# Patient Record
Sex: MALE | Race: WHITE | NOT HISPANIC OR LATINO | Employment: OTHER
[De-identification: names, ages, dates, MRNs, and addresses within clinical notes are randomized per-mention and may not be internally consistent; named-entity substitution may affect disease eponyms.]

---

## 2017-01-19 ENCOUNTER — PRIOR ORIGINAL RECORDS (OUTPATIENT)
Dept: OTHER | Age: 67
End: 2017-01-19

## 2017-04-18 ENCOUNTER — PRIOR ORIGINAL RECORDS (OUTPATIENT)
Dept: OTHER | Age: 67
End: 2017-04-18

## 2017-04-18 LAB
ALBUMIN: 4 G/DL
ALKALINE PHOSPHATATE(ALK PHOS): 38 IU/L
ALT (SGPT): 90 U/L
AST (SGOT): 56 U/L
BILIRUBIN TOTAL: 0.7 MG/DL
BUN: 19 MG/DL
CALCIUM: 9 MG/DL
CHLORIDE: 103 MEQ/L
CHOLESTEROL, TOTAL: 136 MG/DL
CREATININE, SERUM: 0.9 MG/DL
GLUCOSE: 167 MG/DL
HDL CHOLESTEROL: 27 MG/DL
HEMATOCRIT: 41.5 %
HEMOGLOBIN: 14.2 G/DL
LDL CHOLESTEROL: 52 MG/DL
PLATELETS: 170 K/UL
POTASSIUM, SERUM: 4.1 MEQ/L
RED BLOOD COUNT: 4.74 X 10-6/U
SGOT (AST): 56 IU/L
SGPT (ALT): 90 IU/L
SODIUM: 137 MEQ/L
TRIGLYCERIDES: 285 MG/DL
WHITE BLOOD COUNT: 6.28 X 10-3/U

## 2017-08-28 ENCOUNTER — PRIOR ORIGINAL RECORDS (OUTPATIENT)
Dept: OTHER | Age: 67
End: 2017-08-28

## 2017-11-10 ENCOUNTER — PRIOR ORIGINAL RECORDS (OUTPATIENT)
Dept: OTHER | Age: 67
End: 2017-11-10

## 2017-11-10 LAB
ALT (SGPT): 92 U/L
AST (SGOT): 54 U/L
BUN: 22 MG/DL
CHLORIDE: 101 MEQ/L
CHOLESTEROL, TOTAL: 137 MG/DL
CREATININE, SERUM: 0.92 MG/DL
GLUCOSE: 218 MG/DL
GLUCOSE: 218 MG/DL
GLYCOHEMOGLOBIN: 8.2 %
HDL CHOLESTEROL: 24 MG/DL
HEMATOCRIT: 42.1 %
HEMOGLOBIN: 13.8 G/DL
MAGNESIUM: 1.8 MG/DL
PLATELETS: 163 K/UL
POTASSIUM, SERUM: 4.4 MEQ/L
RED BLOOD COUNT: 4.79 X 10-6/U
SGOT (AST): 54 IU/L
SGPT (ALT): 92 IU/L
SODIUM: 135 MEQ/L
TRIGLYCERIDES: 458 MG/DL
WHITE BLOOD COUNT: 5.81 X 10-3/U

## 2017-11-14 ENCOUNTER — MYAURORA ACCOUNT LINK (OUTPATIENT)
Dept: OTHER | Age: 67
End: 2017-11-14

## 2017-11-16 ENCOUNTER — PRIOR ORIGINAL RECORDS (OUTPATIENT)
Dept: OTHER | Age: 67
End: 2017-11-16

## 2017-12-01 ENCOUNTER — PRIOR ORIGINAL RECORDS (OUTPATIENT)
Dept: OTHER | Age: 67
End: 2017-12-01

## 2017-12-15 ENCOUNTER — PRIOR ORIGINAL RECORDS (OUTPATIENT)
Dept: OTHER | Age: 67
End: 2017-12-15

## 2017-12-15 ENCOUNTER — MYAURORA ACCOUNT LINK (OUTPATIENT)
Dept: OTHER | Age: 67
End: 2017-12-15

## 2017-12-18 ENCOUNTER — PRIOR ORIGINAL RECORDS (OUTPATIENT)
Dept: OTHER | Age: 67
End: 2017-12-18

## 2018-01-02 ENCOUNTER — PRIOR ORIGINAL RECORDS (OUTPATIENT)
Dept: OTHER | Age: 68
End: 2018-01-02

## 2018-01-29 ENCOUNTER — PRIOR ORIGINAL RECORDS (OUTPATIENT)
Dept: OTHER | Age: 68
End: 2018-01-29

## 2018-01-29 LAB
BUN: 19 MG/DL
CALCIUM: 9.2 MG/DL
CHLORIDE: 100 MEQ/L
CHOLESTEROL, TOTAL: 126 MG/DL
CREATININE, SERUM: 0.78 MG/DL
GLUCOSE: 169 MG/DL
GLUCOSE: 169 MG/DL
HDL CHOLESTEROL: 28 MG/DL
HEMATOCRIT: 44.5 %
HEMOGLOBIN: 14.5 G/DL
LDL CHOLESTEROL: 57 MG/DL
PLATELETS: 184 K/UL
POTASSIUM, SERUM: 4.1 MEQ/L
RED BLOOD COUNT: 5.04 X 10-6/U
SGOT (AST): 45 IU/L
SGPT (ALT): 86 IU/L
SODIUM: 135 MEQ/L
TRIGLYCERIDES: 395 MG/DL
WHITE BLOOD COUNT: 7.87 X 10-3/U

## 2018-02-26 ENCOUNTER — MYAURORA ACCOUNT LINK (OUTPATIENT)
Dept: OTHER | Age: 68
End: 2018-02-26

## 2018-02-26 ENCOUNTER — PRIOR ORIGINAL RECORDS (OUTPATIENT)
Dept: OTHER | Age: 68
End: 2018-02-26

## 2018-06-14 ENCOUNTER — HOSPITAL ENCOUNTER (OUTPATIENT)
Dept: GENERAL RADIOLOGY | Facility: HOSPITAL | Age: 68
Discharge: HOME | End: 2018-06-14
Attending: SPECIALIST
Payer: MEDICARE

## 2018-06-14 DIAGNOSIS — I25.10 CAD (CORONARY ARTERY DISEASE): ICD-10-CM

## 2018-06-14 DIAGNOSIS — I10 HYPERTENSION, UNSPECIFIED TYPE: ICD-10-CM

## 2018-06-14 DIAGNOSIS — R06.02 SOB (SHORTNESS OF BREATH): ICD-10-CM

## 2018-06-14 PROCEDURE — 71046 X-RAY EXAM CHEST 2 VIEWS: CPT | Performed by: SPECIALIST

## 2018-07-13 ENCOUNTER — OFFICE VISIT (OUTPATIENT)
Dept: FAMILY MEDICINE CLINIC | Facility: CLINIC | Age: 68
End: 2018-07-13

## 2018-07-13 VITALS
RESPIRATION RATE: 16 BRPM | HEART RATE: 76 BPM | BODY MASS INDEX: 34.1 KG/M2 | HEIGHT: 68 IN | WEIGHT: 225 LBS | OXYGEN SATURATION: 96 % | SYSTOLIC BLOOD PRESSURE: 138 MMHG | DIASTOLIC BLOOD PRESSURE: 78 MMHG

## 2018-07-13 DIAGNOSIS — Z86.73 HISTORY OF CVA (CEREBROVASCULAR ACCIDENT): ICD-10-CM

## 2018-07-13 DIAGNOSIS — G47.33 OBSTRUCTIVE SLEEP APNEA SYNDROME: Primary | ICD-10-CM

## 2018-07-13 DIAGNOSIS — I10 ESSENTIAL HYPERTENSION, BENIGN: ICD-10-CM

## 2018-07-13 DIAGNOSIS — E78.2 MIXED HYPERLIPIDEMIA: ICD-10-CM

## 2018-07-13 DIAGNOSIS — F43.10 PTSD (POST-TRAUMATIC STRESS DISORDER): ICD-10-CM

## 2018-07-13 DIAGNOSIS — E11.69 TYPE 2 DIABETES MELLITUS WITH OTHER SPECIFIED COMPLICATION, WITHOUT LONG-TERM CURRENT USE OF INSULIN (HCC): ICD-10-CM

## 2018-07-13 DIAGNOSIS — I25.10 CORONARY ARTERY DISEASE INVOLVING NATIVE HEART WITHOUT ANGINA PECTORIS, UNSPECIFIED VESSEL OR LESION TYPE: ICD-10-CM

## 2018-07-13 DIAGNOSIS — M51.26 LUMBAR DISCOGENIC PAIN SYNDROME: ICD-10-CM

## 2018-07-13 PROCEDURE — 99204 OFFICE O/P NEW MOD 45 MIN: CPT | Performed by: FAMILY MEDICINE

## 2018-07-13 RX ORDER — HYDROCHLOROTHIAZIDE 25 MG/1
25 TABLET ORAL DAILY
COMMUNITY
End: 2020-01-28 | Stop reason: ALTCHOICE

## 2018-07-13 RX ORDER — ETODOLAC 400 MG/1
400 TABLET, FILM COATED ORAL AS NEEDED
COMMUNITY
End: 2020-01-28 | Stop reason: ALTCHOICE

## 2018-07-13 RX ORDER — FENOFIBRATE 160 MG/1
145 TABLET ORAL DAILY
COMMUNITY

## 2018-07-13 RX ORDER — ATORVASTATIN CALCIUM 40 MG/1
40 TABLET, FILM COATED ORAL NIGHTLY
COMMUNITY
End: 2019-11-13 | Stop reason: ALTCHOICE

## 2018-07-13 RX ORDER — PRAZOSIN HYDROCHLORIDE 1 MG/1
1 CAPSULE ORAL NIGHTLY
COMMUNITY

## 2018-07-13 RX ORDER — CHLORAL HYDRATE 500 MG
3000 CAPSULE ORAL DAILY
COMMUNITY
End: 2019-11-13 | Stop reason: ALTCHOICE

## 2018-07-13 RX ORDER — LISINOPRIL 40 MG/1
40 TABLET ORAL DAILY
COMMUNITY

## 2018-07-13 RX ORDER — ASPIRIN 325 MG
325 TABLET ORAL DAILY
COMMUNITY

## 2018-07-13 RX ORDER — GLUCOSAMINE HCL 500 MG
TABLET ORAL
COMMUNITY

## 2018-07-13 RX ORDER — HYDROCODONE BITARTRATE AND ACETAMINOPHEN 5; 325 MG/1; MG/1
1 TABLET ORAL AS NEEDED
COMMUNITY

## 2018-07-13 RX ORDER — AMLODIPINE BESYLATE 5 MG/1
5 TABLET ORAL DAILY
COMMUNITY

## 2018-07-13 RX ORDER — ESCITALOPRAM OXALATE 20 MG/1
40 TABLET ORAL DAILY
COMMUNITY

## 2018-07-13 RX ORDER — METOPROLOL TARTRATE 50 MG/1
50 TABLET, FILM COATED ORAL 2 TIMES DAILY
COMMUNITY

## 2018-07-14 NOTE — PROGRESS NOTES
HPI:   Patient presents with:  Sleep Problem: sleep study, [facility] sleep [place]  Heart Disease  CVA: history  Diabetes  Hypertension  Mental Illness: PTSD      Marvin Ramon is a 77year old male.     He primarily presents for:  Sleep apnea and worsening sn above    Pt has been taking his medication(s) as instructed, no medication side effects. See ROS for additional pertinent positive and negative complaints.     Labs:     Lab Results  Component Value Date/Time   A1C 6.9 (H) 06/18/2010 08:52 AM        Lab Vitamins-Minerals (MULTIVITAL) Oral Tab Take 1 tablet by mouth daily. Disp:  Rfl:    Cholecalciferol (VITAMIN D3) 3000 units Oral Tab Take by mouth. Disp:  Rfl:    omega-3 fatty acids 1000 MG Oral Cap Take 3,000 mg by mouth daily.  Disp:  Rfl:    Etodolac 4 or weakness  PSYCH:  No mood concerns     EXAM:   /78 (BP Location: Right arm)   Pulse 76   Resp 16   Ht 68\"   Wt 225 lb   SpO2 96%   BMI 34.21 kg/m²  Estimated body mass index is 34.21 kg/m² as calculated from the following:    Height as of this en complication, without long-term current use of insulin ([address]  Appears stable, patient sees physician at [facility], will continue present management, follow-up with them as routine and with me in 4-6 months  - insulin glargine (LANTUS) 100 UNIT/ML Subcutane SHIP  Relatively stable on current management, will continue present management, patient to follow-up with psychiatrist through the [place] as routine  - escitalopram 20 MG Oral Tab; Take 40 mg by mouth daily.       The patient indicates understanding of the abo

## 2018-07-21 ENCOUNTER — OFFICE VISIT (OUTPATIENT)
Dept: SLEEP CENTER | Facility: HOSPITAL | Age: 68
End: 2018-07-21
Attending: FAMILY MEDICINE
Payer: MEDICARE

## 2018-07-21 DIAGNOSIS — G47.33 OBSTRUCTIVE SLEEP APNEA SYNDROME: ICD-10-CM

## 2018-07-21 PROCEDURE — 95811 POLYSOM 6/>YRS CPAP 4/> PARM: CPT

## 2018-07-26 ENCOUNTER — MED REC SCAN ONLY (OUTPATIENT)
Dept: FAMILY MEDICINE CLINIC | Facility: CLINIC | Age: 68
End: 2018-07-26

## 2018-07-27 NOTE — PROCEDURES
[address]       Accredited by the [facility] Sleep [facility] ([facility])    PATIENT'S NAME:        NOREEN Donnelly  ATTENDING PHYSICIAN:   Anderson Comer M.D. REFERRING PHYSICIAN:   TAL [place] study, and there was an additional channel for measurement of CPAP flow. FINDINGS:  Please note that this study consists of 2 parts, the first a diagnostic and the second a CPAP titration.     The diagnostic portion of the study had lights out at 10:25 brought to a final pressure of 12. Sleep-disordered breathing appeared to be under relatively good control at a CPAP pressure of 13 which was observed during supine REM and non-REM sleep.   The pressure was increased due to residual events occurring during patient needs to understand the potential dangers associated with reduced daytime vigilance. 6.   Consider further evaluation for restless legs syndrome although it is likely related to his obstructive sleep apnea with some improvement on CPAP.     Thank y

## 2018-10-22 ENCOUNTER — OFFICE VISIT (OUTPATIENT)
Dept: FAMILY MEDICINE CLINIC | Facility: CLINIC | Age: 68
End: 2018-10-22
Payer: MEDICARE

## 2018-10-22 ENCOUNTER — APPOINTMENT (OUTPATIENT)
Dept: LAB | Facility: REFERENCE LAB | Age: 68
End: 2018-10-22
Attending: FAMILY MEDICINE
Payer: MEDICARE

## 2018-10-22 VITALS
DIASTOLIC BLOOD PRESSURE: 62 MMHG | WEIGHT: 232 LBS | OXYGEN SATURATION: 96 % | RESPIRATION RATE: 17 BRPM | TEMPERATURE: 98 F | HEIGHT: 68 IN | SYSTOLIC BLOOD PRESSURE: 138 MMHG | BODY MASS INDEX: 35.16 KG/M2 | HEART RATE: 68 BPM

## 2018-10-22 DIAGNOSIS — J30.89 ALLERGIC RHINITIS DUE TO OTHER ALLERGIC TRIGGER, UNSPECIFIED SEASONALITY: ICD-10-CM

## 2018-10-22 DIAGNOSIS — J30.89 ALLERGIC RHINITIS DUE TO OTHER ALLERGIC TRIGGER, UNSPECIFIED SEASONALITY: Primary | ICD-10-CM

## 2018-10-22 DIAGNOSIS — Z23 ENCOUNTER FOR IMMUNIZATION: ICD-10-CM

## 2018-10-22 DIAGNOSIS — L20.89 OTHER ATOPIC DERMATITIS: ICD-10-CM

## 2018-10-22 PROBLEM — J30.9 ALLERGIC RHINITIS: Status: ACTIVE | Noted: 2018-10-22

## 2018-10-22 PROCEDURE — 90686 IIV4 VACC NO PRSV 0.5 ML IM: CPT | Performed by: FAMILY MEDICINE

## 2018-10-22 PROCEDURE — G0008 ADMIN INFLUENZA VIRUS VAC: HCPCS | Performed by: FAMILY MEDICINE

## 2018-10-22 PROCEDURE — 86003 ALLG SPEC IGE CRUDE XTRC EA: CPT

## 2018-10-22 PROCEDURE — 36415 COLL VENOUS BLD VENIPUNCTURE: CPT

## 2018-10-22 PROCEDURE — 82785 ASSAY OF IGE: CPT

## 2018-10-22 PROCEDURE — 99214 OFFICE O/P EST MOD 30 MIN: CPT | Performed by: FAMILY MEDICINE

## 2018-10-22 RX ORDER — OLOPATADINE HYDROCHLORIDE 2 MG/ML
1 SOLUTION/ DROPS OPHTHALMIC DAILY
Qty: 2.5 ML | Refills: 0 | Status: SHIPPED | OUTPATIENT
Start: 2018-10-22 | End: 2018-10-25

## 2018-10-22 RX ORDER — FLUTICASONE PROPIONATE 50 MCG
2 SPRAY, SUSPENSION (ML) NASAL DAILY
Qty: 3 BOTTLE | Refills: 3 | Status: SHIPPED | OUTPATIENT
Start: 2018-10-22

## 2018-10-22 RX ORDER — LEVOCETIRIZINE DIHYDROCHLORIDE 5 MG/1
5 TABLET, FILM COATED ORAL EVERY EVENING
Qty: 90 TABLET | Refills: 0 | Status: SHIPPED | OUTPATIENT
Start: 2018-10-22

## 2018-10-22 NOTE — PATIENT INSTRUCTIONS
PLAN:  -check allergy testing NOW    -allergy eye drops daily.  If not covered, try OTC Visine-A  -Xyzal (equivalent to Zyrtec) nightly  -Flonase Nasal Spray every morning    -keep skin moisturized  -Eucerin Original Moisturizing Cream (large white tub) twi

## 2018-10-22 NOTE — PROGRESS NOTES
HPI: 77year old male presents c/o Patient presents with:  Eye Problem: started 1 mo ago, itchy, dryness, used otc Vitamin E and Neosporin - no improvement    VSS. C/o R eye swelling, watery & itchy. Denies eye discharge in AM or crusting over.  No injury o normal, no murmur, click, rub, or gallop. Musc: Normal muscular development. Normal gait. Extremities: Extremities normal, atraumatic, no cyanosis or edema.  DP's 2+ B/L  Skin: Skin color, texture, turgor normal. Dry skin on R upper & lower eyelid with cr

## 2018-10-25 ENCOUNTER — TELEPHONE (OUTPATIENT)
Dept: FAMILY MEDICINE CLINIC | Facility: CLINIC | Age: 68
End: 2018-10-25

## 2018-10-25 DIAGNOSIS — J30.89 ALLERGIC RHINITIS DUE TO OTHER ALLERGIC TRIGGER, UNSPECIFIED SEASONALITY: ICD-10-CM

## 2018-10-25 RX ORDER — OLOPATADINE HYDROCHLORIDE 2 MG/ML
1 SOLUTION/ DROPS OPHTHALMIC DAILY
Qty: 2.5 ML | Refills: 0 | Status: SHIPPED | OUTPATIENT
Start: 2018-10-25 | End: 2020-01-28 | Stop reason: ALTCHOICE

## 2018-10-25 NOTE — TELEPHONE ENCOUNTER
Prescription faxed to incorrect number; refaxed to correct number. Pt instructed to contact office if medication is not covered by insurance; pt verbalizes understanding.

## 2018-10-25 NOTE — TELEPHONE ENCOUNTER
Ok-will resend to this pharmacy. Advised patient OTC Visine-A if not covered-see PN 10/22/18.     Rx alternative: Patanol 1 drop to affected eye BID may be considered as well if needed

## 2018-10-25 NOTE — TELEPHONE ENCOUNTER
Pt calling, states his rx for Olopatadine is costing him >$100. Pt states if he uses the [facility] pharmacy he does not pay out of pocket. Pt asking to have rx faxed to [phone] with an explanation of the reason he is needing the medication.    New Rx

## 2018-11-30 ENCOUNTER — TELEPHONE (OUTPATIENT)
Dept: FAMILY MEDICINE CLINIC | Facility: CLINIC | Age: 68
End: 2018-11-30

## 2018-11-30 NOTE — TELEPHONE ENCOUNTER
Called pt in order to find out where he had his diabetic labs done? Hg[facility] Eye exam. We need there records as well.  [place]

## 2018-12-17 NOTE — PROGRESS NOTES
Rcvd lab results from the [facility] for pt; results entered into Ext Result Console and placed on provider's desk for review.

## 2018-12-26 ENCOUNTER — MED REC SCAN ONLY (OUTPATIENT)
Dept: FAMILY MEDICINE CLINIC | Facility: CLINIC | Age: 68
End: 2018-12-26

## 2019-01-07 ENCOUNTER — TELEPHONE (OUTPATIENT)
Dept: FAMILY MEDICINE CLINIC | Facility: CLINIC | Age: 69
End: 2019-01-07

## 2019-02-19 ENCOUNTER — HOSPITAL (OUTPATIENT)
Dept: OTHER | Age: 69
End: 2019-02-19
Attending: SPECIALIST

## 2019-02-19 LAB
ALBUMIN SERPL-MCNC: 4 G/DL (ref 3.6–5.1)
ALBUMIN SERPL-MCNC: 4 GM/DL (ref 3.6–5.1)
ALBUMIN/GLOB SERPL: 1.3 {RATIO} (ref 1–2.4)
ALBUMIN/GLOB SERPL: 1.3 {RATIO} (ref 1–2.4)
ALP SERPL-CCNC: 44 UNIT/L (ref 45–117)
ALP SERPL-CCNC: 44 UNITS/L (ref 45–117)
ALP SERPL-CCNC: ABNORMAL U/L
ALT SERPL-CCNC: 62 UNIT/L
ALT SERPL-CCNC: 62 UNITS/L
ANALYZER ANC (IANC): NORMAL
ANALYZER ANC (IANC): NORMAL
ANION GAP SERPL CALC-SCNC: 15 MMOL/L (ref 10–20)
ANION GAP SERPL CALC-SCNC: 15 MMOL/L (ref 10–20)
AST SERPL-CCNC: 37 UNIT/L
AST SERPL-CCNC: 37 UNITS/L
BILIRUB SERPL-MCNC: 0.5 MG/DL (ref 0.2–1)
BILIRUB SERPL-MCNC: 0.5 MG/DL (ref 0.2–1)
BUN SERPL-MCNC: 20 MG/DL (ref 6–20)
BUN SERPL-MCNC: 20 MG/DL (ref 6–20)
BUN/CREAT SERPL: 27 (ref 7–25)
BUN/CREAT SERPL: 27 (ref 7–25)
CALCIUM SERPL-MCNC: 8.9 MG/DL (ref 8.4–10.2)
CALCIUM SERPL-MCNC: 8.9 MG/DL (ref 8.4–10.2)
CHLORIDE SERPL-SCNC: 100 MMOL/L (ref 98–107)
CHLORIDE: 100 MMOL/L (ref 98–107)
CHOLEST SERPL-MCNC: 133 MG/DL
CHOLEST SERPL-MCNC: 133 MG/DL
CHOLEST SERPL-MCNC: ABNORMAL MG/DL
CHOLEST/HDLC SERPL: 6 {RATIO}
CHOLEST/HDLC SERPL: 6 {RATIO}
CO2 SERPL-SCNC: 26 MMOL/L (ref 21–32)
CO2 SERPL-SCNC: 26 MMOL/L (ref 21–32)
CREAT SERPL-MCNC: 0.75 MG/DL (ref 0.67–1.17)
CREAT SERPL-MCNC: 0.75 MG/DL (ref 0.67–1.17)
ERYTHROCYTE [DISTWIDTH] IN BLOOD: 13.7 % (ref 11–15)
ERYTHROCYTE [DISTWIDTH] IN BLOOD: 13.7 % (ref 11–15)
GLOBULIN SER-MCNC: 3.1 G/DL (ref 2–4)
GLOBULIN SER-MCNC: 3.1 GM/DL (ref 2–4)
GLUCOSE SERPL-MCNC: 291 MG/DL (ref 65–99)
GLUCOSE SERPL-MCNC: 291 MG/DL (ref 65–99)
HCT VFR BLD CALC: 41.9 % (ref 39–51)
HDLC SERPL-MCNC: 22 MG/DL
HDLC SERPL-MCNC: 22 MG/DL
HDLC SERPL-MCNC: ABNORMAL MG/DL
HEMATOCRIT: 41.9 % (ref 39–51)
HGB BLD-MCNC: 14.2 G/DL (ref 13–17)
HGB BLD-MCNC: 14.2 GM/DL (ref 13–17)
LDLC SERPL CALC-MCNC: ABNORMAL MG/DL
LDLC SERPL CALC-MCNC: ABNORMAL MG/DL
LENGTH OF FAST TIME PATIENT: ABNORMAL HR
LENGTH OF FAST TIME PATIENT: ABNORMAL HR
LENGTH OF FAST TIME PATIENT: ABNORMAL HRS
LENGTH OF FAST TIME PATIENT: ABNORMAL HRS
MCH RBC QN AUTO: 28.8 PG (ref 26–34)
MCH RBC QN AUTO: 28.8 PG (ref 26–34)
MCHC RBC AUTO-ENTMCNC: 33.9 G/DL (ref 32–36.5)
MCHC RBC AUTO-ENTMCNC: 33.9 GM/DL (ref 32–36.5)
MCV RBC AUTO: 85 FL (ref 78–100)
MCV RBC AUTO: 85 FL (ref 78–100)
NONHDLC SERPL-MCNC: 111 MG/DL
NONHDLC SERPL-MCNC: 111 MG/DL
NONHDLC SERPL-MCNC: ABNORMAL MG/DL
NRBC (NRBCRE): 0 /100 WBC
NRBC (NRBCRE): 0 /100 WBC
PLATELET # BLD: 196 K/MCL (ref 140–450)
PLATELET # BLD: 196 THOUSAND/MCL (ref 140–450)
POTASSIUM SERPL-SCNC: 5 MMOL/L (ref 3.4–5.1)
POTASSIUM SERPL-SCNC: 5 MMOL/L (ref 3.4–5.1)
POTASSIUM SERPL-SCNC: ABNORMAL MMOL/L
PROT SERPL-MCNC: 7.1 G/DL (ref 6.4–8.2)
PROT SERPL-MCNC: 7.1 GM/DL (ref 6.4–8.2)
RBC # BLD: 4.93 MIL/MCL (ref 4.5–5.9)
RBC # BLD: 4.93 MILLION/MCL (ref 4.5–5.9)
SODIUM SERPL-SCNC: 136 MMOL/L (ref 135–145)
SODIUM SERPL-SCNC: 136 MMOL/L (ref 135–145)
TRIGL SERPL-MCNC: 687 MG/DL
TRIGL SERPL-MCNC: ABNORMAL MG/DL
TRIGLYCERIDE (TRIGP): 687 MG/DL
WBC # BLD: 6 K/MCL (ref 4.2–11)
WBC # BLD: 6 THOUSAND/MCL (ref 4.2–11)

## 2019-02-26 ENCOUNTER — DIAGNOSTIC TRANS (OUTPATIENT)
Dept: OTHER | Age: 69
End: 2019-02-26

## 2019-02-26 ENCOUNTER — HOSPITAL (OUTPATIENT)
Dept: OTHER | Age: 69
End: 2019-02-26
Attending: SPECIALIST

## 2019-02-26 LAB
GLUCOSE BLDC GLUCOMTR-MCNC: 189 MG/DL (ref 65–99)
GLUCOSE BLDC GLUCOMTR-MCNC: 302 MG/DL (ref 65–99)

## 2019-02-28 VITALS
BODY MASS INDEX: 33.47 KG/M2 | WEIGHT: 226 LBS | HEIGHT: 69 IN | HEART RATE: 96 BPM | RESPIRATION RATE: 16 BRPM | SYSTOLIC BLOOD PRESSURE: 170 MMHG | DIASTOLIC BLOOD PRESSURE: 60 MMHG

## 2019-02-28 VITALS
SYSTOLIC BLOOD PRESSURE: 170 MMHG | WEIGHT: 227 LBS | BODY MASS INDEX: 33.62 KG/M2 | HEART RATE: 84 BPM | DIASTOLIC BLOOD PRESSURE: 84 MMHG | HEIGHT: 69 IN

## 2019-02-28 VITALS
HEART RATE: 92 BPM | BODY MASS INDEX: 33.47 KG/M2 | DIASTOLIC BLOOD PRESSURE: 82 MMHG | WEIGHT: 226 LBS | SYSTOLIC BLOOD PRESSURE: 152 MMHG | HEIGHT: 69 IN

## 2019-02-28 VITALS
DIASTOLIC BLOOD PRESSURE: 70 MMHG | SYSTOLIC BLOOD PRESSURE: 152 MMHG | HEART RATE: 100 BPM | BODY MASS INDEX: 33.33 KG/M2 | WEIGHT: 225 LBS | HEIGHT: 69 IN

## 2019-02-28 VITALS
HEIGHT: 69 IN | HEART RATE: 76 BPM | RESPIRATION RATE: 16 BRPM | SYSTOLIC BLOOD PRESSURE: 168 MMHG | DIASTOLIC BLOOD PRESSURE: 74 MMHG | BODY MASS INDEX: 34.21 KG/M2 | WEIGHT: 231 LBS

## 2019-03-01 VITALS
BODY MASS INDEX: 33.47 KG/M2 | HEIGHT: 69 IN | HEART RATE: 80 BPM | DIASTOLIC BLOOD PRESSURE: 88 MMHG | SYSTOLIC BLOOD PRESSURE: 158 MMHG | WEIGHT: 226 LBS

## 2019-03-12 ENCOUNTER — OFFICE VISIT (OUTPATIENT)
Dept: FAMILY MEDICINE CLINIC | Facility: CLINIC | Age: 69
End: 2019-03-12
Payer: MEDICARE

## 2019-03-12 DIAGNOSIS — I10 ESSENTIAL HYPERTENSION, BENIGN: ICD-10-CM

## 2019-03-12 DIAGNOSIS — Z00.00 ROUTINE MEDICAL EXAM: Primary | ICD-10-CM

## 2019-03-12 DIAGNOSIS — E78.2 MIXED HYPERLIPIDEMIA: ICD-10-CM

## 2019-03-12 DIAGNOSIS — Z86.73 HISTORY OF CVA (CEREBROVASCULAR ACCIDENT): ICD-10-CM

## 2019-03-12 DIAGNOSIS — I25.810 CORONARY ARTERY DISEASE INVOLVING CORONARY BYPASS GRAFT OF NATIVE HEART WITHOUT ANGINA PECTORIS: ICD-10-CM

## 2019-03-12 DIAGNOSIS — Z12.5 PROSTATE CANCER SCREENING: ICD-10-CM

## 2019-03-12 DIAGNOSIS — E11.69 TYPE 2 DIABETES MELLITUS WITH OTHER SPECIFIED COMPLICATION, WITHOUT LONG-TERM CURRENT USE OF INSULIN (HCC): ICD-10-CM

## 2019-03-12 PROCEDURE — G0439 PPPS, SUBSEQ VISIT: HCPCS | Performed by: FAMILY MEDICINE

## 2019-03-12 PROCEDURE — 99213 OFFICE O/P EST LOW 20 MIN: CPT | Performed by: FAMILY MEDICINE

## 2019-03-14 ENCOUNTER — TELEPHONE (OUTPATIENT)
Dept: FAMILY MEDICINE CLINIC | Facility: CLINIC | Age: 69
End: 2019-03-14

## 2019-03-14 VITALS
HEIGHT: 68 IN | WEIGHT: 223 LBS | SYSTOLIC BLOOD PRESSURE: 138 MMHG | RESPIRATION RATE: 16 BRPM | TEMPERATURE: 98 F | HEART RATE: 80 BPM | OXYGEN SATURATION: 97 % | DIASTOLIC BLOOD PRESSURE: 70 MMHG | BODY MASS INDEX: 33.8 KG/M2

## 2019-03-14 NOTE — TELEPHONE ENCOUNTER
FYI: Need annual diabetic eye exam report, recent labs-will get in April 2019 through [place], and immunization record, NO [place] to contact, will have CABG (3 vessel bypass surgery at [facility] 3/15/19)

## 2019-03-14 NOTE — H&P
HPI:   Patient presents with:  Physical: Medicare      Guillermina Townsend is a 77year old male who presents for a complete physical exam.     Patient has new complaints of:  · Angina, none at present, scheduled for three-vessel coronary bypass Friday, 3/15/20 RESPIMAT)  MCG/ACT Inhalation Aero Soln Inhale into the lungs. Disp:  Rfl:    escitalopram 20 MG Oral Tab Take 40 mg by mouth daily. Disp:  Rfl:    Fenofibrate 160 MG Oral Tab Take 160 mg by mouth daily.  Disp:  Rfl:    hydrochlorothiazide 25 MG Oral Onset   • Heart Disorder Father         mi   • Heart Disorder Mother         mi at age 80   • Other (Other) Mother         h/o cva       Niaspan [Niacin]        HIVES, RASH    Comment:Only 1000mg dose, otherwise ok with smaller doses   Social History:  Soc auscultation B, no wheezing and no rales or rhonchi B   CARDIO: RRR, 2/6 HENOK, NL S1 S2, no S3 S4  EXT: no CCE, Brachial, PT and DP pulses WNL B UE/LE  GI: NABS, soft, nodistended, no masses, no HSM or tenderness  MS: grossly NL B UE/LE, no change from last where he can also get his prescriptions at a lower cost  Patient to have his annual eye exam report forwarded to me as well    4.  Coronary artery disease involving coronary bypass graft of native heart without angina pectoris  Patient scheduled for three-v

## 2019-03-20 NOTE — TELEPHONE ENCOUNTER
Call[facility] (p: [phone]), spoke with Estuardo Hickman, he requested that release of info to be faxed to him (f: [phone]), per Estuardo Hickman - ok if pt did not sign release. Release of info filled out; requesting DM eye exam, recent labs, and immunization record.   Form g

## 2019-03-25 ENCOUNTER — TELEPHONE (OUTPATIENT)
Dept: FAMILY MEDICINE CLINIC | Facility: CLINIC | Age: 69
End: 2019-03-25

## 2019-03-25 NOTE — TELEPHONE ENCOUNTER
Records reviewed-PSA updated from June '18. Last diabetic eye exam June '18.  Patient pending [facility] in [facility] '19 with [facility] per MSW's notes (OV 03/12/19)

## 2019-03-25 NOTE — TELEPHONE ENCOUNTER
Rcvd pt's MR from [place]. Entered pt's immunization record and diabetic eye exam results from 6/19/18.

## 2019-04-30 ENCOUNTER — CARDPULM VISIT (OUTPATIENT)
Dept: CARDIAC REHAB | Facility: HOSPITAL | Age: 69
End: 2019-04-30
Attending: SPECIALIST
Payer: MEDICARE

## 2019-04-30 VITALS
HEIGHT: 67.72 IN | BODY MASS INDEX: 33.13 KG/M2 | SYSTOLIC BLOOD PRESSURE: 136 MMHG | DIASTOLIC BLOOD PRESSURE: 62 MMHG | HEART RATE: 80 BPM | OXYGEN SATURATION: 95 % | WEIGHT: 216.06 LBS

## 2019-05-06 ENCOUNTER — CARDPULM VISIT (OUTPATIENT)
Dept: CARDIAC REHAB | Facility: HOSPITAL | Age: 69
End: 2019-05-06
Attending: SPECIALIST
Payer: MEDICARE

## 2019-05-06 PROCEDURE — 82962 GLUCOSE BLOOD TEST: CPT

## 2019-05-06 PROCEDURE — 93798 PHYS/QHP OP CAR RHAB W/ECG: CPT

## 2019-05-08 ENCOUNTER — CARDPULM VISIT (OUTPATIENT)
Dept: CARDIAC REHAB | Facility: HOSPITAL | Age: 69
End: 2019-05-08
Attending: SPECIALIST
Payer: MEDICARE

## 2019-05-08 PROCEDURE — 93798 PHYS/QHP OP CAR RHAB W/ECG: CPT

## 2019-05-10 ENCOUNTER — CARDPULM VISIT (OUTPATIENT)
Dept: CARDIAC REHAB | Facility: HOSPITAL | Age: 69
End: 2019-05-10
Attending: SPECIALIST
Payer: MEDICARE

## 2019-05-10 PROCEDURE — 93798 PHYS/QHP OP CAR RHAB W/ECG: CPT

## 2019-05-13 ENCOUNTER — CARDPULM VISIT (OUTPATIENT)
Dept: CARDIAC REHAB | Facility: HOSPITAL | Age: 69
End: 2019-05-13
Attending: SPECIALIST
Payer: MEDICARE

## 2019-05-13 PROCEDURE — 93798 PHYS/QHP OP CAR RHAB W/ECG: CPT

## 2019-05-15 ENCOUNTER — CARDPULM VISIT (OUTPATIENT)
Dept: CARDIAC REHAB | Facility: HOSPITAL | Age: 69
End: 2019-05-15
Attending: SPECIALIST
Payer: MEDICARE

## 2019-05-15 PROCEDURE — 93798 PHYS/QHP OP CAR RHAB W/ECG: CPT

## 2019-05-17 ENCOUNTER — CARDPULM VISIT (OUTPATIENT)
Dept: CARDIAC REHAB | Facility: HOSPITAL | Age: 69
End: 2019-05-17
Attending: SPECIALIST
Payer: MEDICARE

## 2019-05-17 PROCEDURE — 93798 PHYS/QHP OP CAR RHAB W/ECG: CPT

## 2019-05-19 ENCOUNTER — TELEPHONE (OUTPATIENT)
Dept: FAMILY MEDICINE CLINIC | Facility: CLINIC | Age: 69
End: 2019-05-19

## 2019-05-19 NOTE — TELEPHONE ENCOUNTER
Call pt or his wife, Steven Mukherjee I did review his labs  Please confirm that he is following up for his anemia with ordering provider at [facility]      (Scanned) lab results from [facility]  4/23/19 reviewed:  CBC abnl (anemia-Hgb 10.3, )  Glucose 128  Mag 1.5  HDL 2

## 2019-05-20 ENCOUNTER — CARDPULM VISIT (OUTPATIENT)
Dept: CARDIAC REHAB | Facility: HOSPITAL | Age: 69
End: 2019-05-20
Attending: SPECIALIST
Payer: MEDICARE

## 2019-05-20 PROCEDURE — 93798 PHYS/QHP OP CAR RHAB W/ECG: CPT

## 2019-05-22 ENCOUNTER — CARDPULM VISIT (OUTPATIENT)
Dept: CARDIAC REHAB | Facility: HOSPITAL | Age: 69
End: 2019-05-22
Attending: SPECIALIST
Payer: MEDICARE

## 2019-05-22 PROCEDURE — 93798 PHYS/QHP OP CAR RHAB W/ECG: CPT

## 2019-05-24 ENCOUNTER — CARDPULM VISIT (OUTPATIENT)
Dept: CARDIAC REHAB | Facility: HOSPITAL | Age: 69
End: 2019-05-24
Attending: SPECIALIST
Payer: MEDICARE

## 2019-05-24 PROCEDURE — 93798 PHYS/QHP OP CAR RHAB W/ECG: CPT

## 2019-05-29 ENCOUNTER — CARDPULM VISIT (OUTPATIENT)
Dept: CARDIAC REHAB | Facility: HOSPITAL | Age: 69
End: 2019-05-29
Attending: SPECIALIST
Payer: MEDICARE

## 2019-05-29 PROCEDURE — 93798 PHYS/QHP OP CAR RHAB W/ECG: CPT

## 2019-05-31 ENCOUNTER — CARDPULM VISIT (OUTPATIENT)
Dept: CARDIAC REHAB | Facility: HOSPITAL | Age: 69
End: 2019-05-31
Attending: SPECIALIST
Payer: MEDICARE

## 2019-05-31 PROCEDURE — 93798 PHYS/QHP OP CAR RHAB W/ECG: CPT

## 2019-06-03 ENCOUNTER — CARDPULM VISIT (OUTPATIENT)
Dept: CARDIAC REHAB | Facility: HOSPITAL | Age: 69
End: 2019-06-03
Attending: SPECIALIST
Payer: MEDICARE

## 2019-06-03 PROCEDURE — 93798 PHYS/QHP OP CAR RHAB W/ECG: CPT

## 2019-06-05 ENCOUNTER — CARDPULM VISIT (OUTPATIENT)
Dept: CARDIAC REHAB | Facility: HOSPITAL | Age: 69
End: 2019-06-05
Attending: SPECIALIST
Payer: MEDICARE

## 2019-06-05 PROCEDURE — 93798 PHYS/QHP OP CAR RHAB W/ECG: CPT

## 2019-06-07 ENCOUNTER — CARDPULM VISIT (OUTPATIENT)
Dept: CARDIAC REHAB | Facility: HOSPITAL | Age: 69
End: 2019-06-07
Attending: SPECIALIST
Payer: MEDICARE

## 2019-06-07 PROCEDURE — 93798 PHYS/QHP OP CAR RHAB W/ECG: CPT

## 2019-06-10 ENCOUNTER — CARDPULM VISIT (OUTPATIENT)
Dept: CARDIAC REHAB | Facility: HOSPITAL | Age: 69
End: 2019-06-10
Attending: SPECIALIST
Payer: MEDICARE

## 2019-06-10 PROCEDURE — 93798 PHYS/QHP OP CAR RHAB W/ECG: CPT

## 2019-06-12 ENCOUNTER — CARDPULM VISIT (OUTPATIENT)
Dept: CARDIAC REHAB | Facility: HOSPITAL | Age: 69
End: 2019-06-12
Attending: SPECIALIST
Payer: MEDICARE

## 2019-06-12 PROCEDURE — 93798 PHYS/QHP OP CAR RHAB W/ECG: CPT

## 2019-06-14 ENCOUNTER — CARDPULM VISIT (OUTPATIENT)
Dept: CARDIAC REHAB | Facility: HOSPITAL | Age: 69
End: 2019-06-14
Attending: SPECIALIST
Payer: MEDICARE

## 2019-06-14 PROCEDURE — 93798 PHYS/QHP OP CAR RHAB W/ECG: CPT

## 2019-06-17 ENCOUNTER — CARDPULM VISIT (OUTPATIENT)
Dept: CARDIAC REHAB | Facility: HOSPITAL | Age: 69
End: 2019-06-17
Attending: SPECIALIST
Payer: MEDICARE

## 2019-06-17 PROCEDURE — 93798 PHYS/QHP OP CAR RHAB W/ECG: CPT

## 2019-06-19 ENCOUNTER — APPOINTMENT (OUTPATIENT)
Dept: CARDIAC REHAB | Facility: HOSPITAL | Age: 69
End: 2019-06-19
Attending: SPECIALIST
Payer: MEDICARE

## 2019-06-21 ENCOUNTER — CARDPULM VISIT (OUTPATIENT)
Dept: CARDIAC REHAB | Facility: HOSPITAL | Age: 69
End: 2019-06-21
Attending: SPECIALIST
Payer: MEDICARE

## 2019-06-21 PROCEDURE — 93798 PHYS/QHP OP CAR RHAB W/ECG: CPT

## 2019-06-24 ENCOUNTER — APPOINTMENT (OUTPATIENT)
Dept: CARDIAC REHAB | Facility: HOSPITAL | Age: 69
End: 2019-06-24
Attending: SPECIALIST
Payer: MEDICARE

## 2019-06-26 ENCOUNTER — CARDPULM VISIT (OUTPATIENT)
Dept: CARDIAC REHAB | Facility: HOSPITAL | Age: 69
End: 2019-06-26
Attending: SPECIALIST
Payer: MEDICARE

## 2019-06-26 PROCEDURE — 93798 PHYS/QHP OP CAR RHAB W/ECG: CPT

## 2019-06-28 ENCOUNTER — CARDPULM VISIT (OUTPATIENT)
Dept: CARDIAC REHAB | Facility: HOSPITAL | Age: 69
End: 2019-06-28
Attending: SPECIALIST
Payer: MEDICARE

## 2019-06-28 PROCEDURE — 93798 PHYS/QHP OP CAR RHAB W/ECG: CPT

## 2019-07-01 ENCOUNTER — CARDPULM VISIT (OUTPATIENT)
Dept: CARDIAC REHAB | Facility: HOSPITAL | Age: 69
End: 2019-07-01
Attending: SPECIALIST
Payer: MEDICARE

## 2019-07-01 PROCEDURE — 93798 PHYS/QHP OP CAR RHAB W/ECG: CPT

## 2019-07-03 ENCOUNTER — CARDPULM VISIT (OUTPATIENT)
Dept: CARDIAC REHAB | Facility: HOSPITAL | Age: 69
End: 2019-07-03
Attending: SPECIALIST
Payer: MEDICARE

## 2019-07-03 PROCEDURE — 93798 PHYS/QHP OP CAR RHAB W/ECG: CPT

## 2019-07-05 ENCOUNTER — CARDPULM VISIT (OUTPATIENT)
Dept: CARDIAC REHAB | Facility: HOSPITAL | Age: 69
End: 2019-07-05
Attending: SPECIALIST
Payer: MEDICARE

## 2019-07-05 PROCEDURE — 93798 PHYS/QHP OP CAR RHAB W/ECG: CPT

## 2019-07-08 ENCOUNTER — CARDPULM VISIT (OUTPATIENT)
Dept: CARDIAC REHAB | Facility: HOSPITAL | Age: 69
End: 2019-07-08
Attending: SPECIALIST
Payer: MEDICARE

## 2019-07-08 PROCEDURE — 93798 PHYS/QHP OP CAR RHAB W/ECG: CPT

## 2019-07-10 ENCOUNTER — CARDPULM VISIT (OUTPATIENT)
Dept: CARDIAC REHAB | Facility: HOSPITAL | Age: 69
End: 2019-07-10
Attending: SPECIALIST
Payer: MEDICARE

## 2019-07-10 PROCEDURE — 93798 PHYS/QHP OP CAR RHAB W/ECG: CPT

## 2019-07-12 ENCOUNTER — CARDPULM VISIT (OUTPATIENT)
Dept: CARDIAC REHAB | Facility: HOSPITAL | Age: 69
End: 2019-07-12
Attending: SPECIALIST
Payer: MEDICARE

## 2019-07-12 PROCEDURE — 93798 PHYS/QHP OP CAR RHAB W/ECG: CPT

## 2019-07-15 ENCOUNTER — CARDPULM VISIT (OUTPATIENT)
Dept: CARDIAC REHAB | Facility: HOSPITAL | Age: 69
End: 2019-07-15
Attending: SPECIALIST
Payer: MEDICARE

## 2019-07-15 PROCEDURE — 93798 PHYS/QHP OP CAR RHAB W/ECG: CPT

## 2019-07-17 ENCOUNTER — CARDPULM VISIT (OUTPATIENT)
Dept: CARDIAC REHAB | Facility: HOSPITAL | Age: 69
End: 2019-07-17
Attending: SPECIALIST
Payer: MEDICARE

## 2019-07-17 PROCEDURE — 93798 PHYS/QHP OP CAR RHAB W/ECG: CPT

## 2019-07-19 ENCOUNTER — CARDPULM VISIT (OUTPATIENT)
Dept: CARDIAC REHAB | Facility: HOSPITAL | Age: 69
End: 2019-07-19
Attending: SPECIALIST
Payer: MEDICARE

## 2019-07-19 PROCEDURE — 93798 PHYS/QHP OP CAR RHAB W/ECG: CPT

## 2019-07-22 ENCOUNTER — CARDPULM VISIT (OUTPATIENT)
Dept: CARDIAC REHAB | Facility: HOSPITAL | Age: 69
End: 2019-07-22
Attending: SPECIALIST
Payer: MEDICARE

## 2019-07-22 PROCEDURE — 93798 PHYS/QHP OP CAR RHAB W/ECG: CPT

## 2019-07-24 ENCOUNTER — CARDPULM VISIT (OUTPATIENT)
Dept: CARDIAC REHAB | Facility: HOSPITAL | Age: 69
End: 2019-07-24
Attending: SPECIALIST
Payer: MEDICARE

## 2019-07-24 PROCEDURE — 93798 PHYS/QHP OP CAR RHAB W/ECG: CPT

## 2019-07-26 ENCOUNTER — CARDPULM VISIT (OUTPATIENT)
Dept: CARDIAC REHAB | Facility: HOSPITAL | Age: 69
End: 2019-07-26
Attending: SPECIALIST
Payer: MEDICARE

## 2019-07-26 PROCEDURE — 93798 PHYS/QHP OP CAR RHAB W/ECG: CPT

## 2019-07-29 ENCOUNTER — CARDPULM VISIT (OUTPATIENT)
Dept: CARDIAC REHAB | Facility: HOSPITAL | Age: 69
End: 2019-07-29
Attending: SPECIALIST
Payer: MEDICARE

## 2019-07-29 PROCEDURE — 93798 PHYS/QHP OP CAR RHAB W/ECG: CPT

## 2019-07-31 ENCOUNTER — CARDPULM VISIT (OUTPATIENT)
Dept: CARDIAC REHAB | Facility: HOSPITAL | Age: 69
End: 2019-07-31
Attending: SPECIALIST
Payer: MEDICARE

## 2019-07-31 PROCEDURE — 93798 PHYS/QHP OP CAR RHAB W/ECG: CPT

## 2019-08-02 ENCOUNTER — APPOINTMENT (OUTPATIENT)
Dept: CARDIAC REHAB | Facility: HOSPITAL | Age: 69
End: 2019-08-02
Attending: SPECIALIST
Payer: MEDICARE

## 2019-08-05 ENCOUNTER — APPOINTMENT (OUTPATIENT)
Dept: CARDIAC REHAB | Facility: HOSPITAL | Age: 69
End: 2019-08-05
Attending: SPECIALIST
Payer: MEDICARE

## 2019-11-13 ENCOUNTER — OFFICE VISIT (OUTPATIENT)
Dept: FAMILY MEDICINE CLINIC | Facility: CLINIC | Age: 69
End: 2019-11-13
Payer: MEDICARE

## 2019-11-13 VITALS
HEIGHT: 68 IN | RESPIRATION RATE: 16 BRPM | HEART RATE: 70 BPM | DIASTOLIC BLOOD PRESSURE: 68 MMHG | TEMPERATURE: 98 F | BODY MASS INDEX: 34.1 KG/M2 | WEIGHT: 225 LBS | SYSTOLIC BLOOD PRESSURE: 128 MMHG | OXYGEN SATURATION: 98 %

## 2019-11-13 DIAGNOSIS — E11.69 TYPE 2 DIABETES MELLITUS WITH OTHER SPECIFIED COMPLICATION, WITHOUT LONG-TERM CURRENT USE OF INSULIN (HCC): Primary | ICD-10-CM

## 2019-11-13 DIAGNOSIS — Z23 NEED FOR SHINGLES VACCINE: ICD-10-CM

## 2019-11-13 DIAGNOSIS — L21.9 SEBORRHEIC DERMATITIS: ICD-10-CM

## 2019-11-13 DIAGNOSIS — I25.810 CORONARY ARTERY DISEASE INVOLVING CORONARY BYPASS GRAFT OF NATIVE HEART WITHOUT ANGINA PECTORIS: ICD-10-CM

## 2019-11-13 DIAGNOSIS — E78.2 MIXED HYPERLIPIDEMIA: ICD-10-CM

## 2019-11-13 DIAGNOSIS — I10 ESSENTIAL HYPERTENSION, BENIGN: ICD-10-CM

## 2019-11-13 PROCEDURE — 99214 OFFICE O/P EST MOD 30 MIN: CPT | Performed by: FAMILY MEDICINE

## 2019-11-13 RX ORDER — ATORVASTATIN CALCIUM 40 MG/1
80 TABLET, FILM COATED ORAL
COMMUNITY
Start: 2019-03-22 | End: 2020-01-28 | Stop reason: ALTCHOICE

## 2019-11-15 ENCOUNTER — TELEPHONE (OUTPATIENT)
Dept: FAMILY MEDICINE CLINIC | Facility: CLINIC | Age: 69
End: 2019-11-15

## 2019-11-16 NOTE — PROGRESS NOTES
HPI:   Patient presents with:  Diabetes  HTN  Hyperlipidemia  Derm Problem      Moreno Manuel is a 68year old male who presents for recheck of his diabetes    Checks glucose at home  Patient’s fBS have been: wnl.   Last HgbA1c: 6.9 10/31/19     Lab Results 06/18/2010 08:52 AM    ALB 4.5 06/18/2010 08:52 AM    TP 6.7 06/18/2010 08:52 AM    ALKPHO 37 06/18/2010 08:52 AM    AST 48 (H) 06/18/2010 08:52 AM    ALT 75 (H) 06/18/2010 08:52 AM    TSH 4.400 10/29/2018           Medications:  atorvastatin 40 MG Oral Ta current facility-administered medications on file prior to visit.       Past Medical History:   Diagnosis Date   • ASTHMA    • BACK PAIN    • DEPRESSION     on meds s/p cabg   • DIABETES    • HYPERTENSION    • HYPERTRIGLYCERIDEMIA    • SLEEP APNEA       Pas is 34.21 kg/m² as calculated from the following:    Height as of this encounter: 68\". Weight as of this encounter: 225 lb (102.1 kg).   Wt Readings from Last 3 Encounters:  11/13/19 : 225 lb (102.1 kg)  04/30/19 : 216 lb 0.8 oz (98 kg)  03/12/19 : 223 l routine eye exam  · Check feet daily. Podiarty evaluation prn. · Follow up pending test results and  in 6 months    Additional Assessment and Plan:  1.  Type 2 diabetes mellitus with other specified complication, without long-term current use of insulin (

## 2019-11-27 ENCOUNTER — TELEPHONE (OUTPATIENT)
Dept: FAMILY MEDICINE CLINIC | Facility: CLINIC | Age: 69
End: 2019-11-27

## 2019-11-27 NOTE — TELEPHONE ENCOUNTER
I changed referral to [facility], Dr. Darnell Skelton, he can see her or me for eval, possible bx

## 2019-11-27 NOTE — TELEPHONE ENCOUNTER
Pt cld wants a cologuard ordered. Also needs to have a mole removed so needs referral to a dermatologist.I told him  does remove moles also. He meant to show it to her at last appt but forgot. He wants to know if he should come to her for mole removal or be rfrd out. Would like clinical to advise him.

## 2020-01-28 ENCOUNTER — OFFICE VISIT (OUTPATIENT)
Dept: FAMILY MEDICINE CLINIC | Facility: CLINIC | Age: 70
End: 2020-01-28
Payer: MEDICARE

## 2020-01-28 VITALS
BODY MASS INDEX: 34.4 KG/M2 | SYSTOLIC BLOOD PRESSURE: 138 MMHG | HEART RATE: 61 BPM | RESPIRATION RATE: 16 BRPM | WEIGHT: 227 LBS | OXYGEN SATURATION: 96 % | HEIGHT: 68 IN | DIASTOLIC BLOOD PRESSURE: 62 MMHG

## 2020-01-28 DIAGNOSIS — R21 RASH OF BACK: ICD-10-CM

## 2020-01-28 DIAGNOSIS — L72.3 SEBACEOUS CYST: ICD-10-CM

## 2020-01-28 DIAGNOSIS — R21 RASH OF FACE: Primary | ICD-10-CM

## 2020-01-28 PROCEDURE — 10060 I&D ABSCESS SIMPLE/SINGLE: CPT | Performed by: FAMILY MEDICINE

## 2020-01-28 PROCEDURE — 99213 OFFICE O/P EST LOW 20 MIN: CPT | Performed by: FAMILY MEDICINE

## 2020-01-28 RX ORDER — MELATONIN
325
COMMUNITY
Start: 2019-03-22

## 2020-01-28 RX ORDER — CEPHALEXIN 500 MG/1
500 CAPSULE ORAL 3 TIMES DAILY
Qty: 21 CAPSULE | Refills: 1 | Status: SHIPPED | OUTPATIENT
Start: 2020-01-28 | End: 2020-09-18

## 2020-01-28 RX ORDER — ATORVASTATIN CALCIUM 80 MG/1
80 TABLET, FILM COATED ORAL NIGHTLY
COMMUNITY

## 2020-01-28 RX ORDER — CLOTRIMAZOLE AND BETAMETHASONE DIPROPIONATE 10; .64 MG/G; MG/G
1 CREAM TOPICAL 2 TIMES DAILY
Qty: 1 TUBE | Refills: 0 | Status: SHIPPED | OUTPATIENT
Start: 2020-01-28 | End: 2020-09-18

## 2020-01-29 NOTE — PROCEDURES
HPI:  Patient presents with:  Derm Problem: lump on back of neck & some rough spots on back      Aakash Bermudez is a 68year old male who presents with skin nodule lesion and for skin lesions-L upper back and rash-R periorbital    Located on neck, Has had fo Subcutaneous Solution Inject 70 Units into the skin nightly. • lisinopril 40 MG Oral Tab Take 40 mg by mouth daily. • MetFORMIN HCl 1000 MG Oral Tab Take 1,000 mg by mouth 2 (two) times daily with meals.  1 tab in AM & 1 1/2 tab in PM      • [place]op Location: Right arm)   Pulse 61   Resp 16   Ht 68\"   Wt 227 lb (103 kg)   SpO2 96%   BMI 34.52 kg/m²   GENERAL: well developed, well nourished, male in no apparent distress  SKIN: R periorbital;erythema, sl raised dry flaky w raised edges, post neck: 3-4 recommendations and agrees to the above plan. Follow up: as above    No orders of the defined types were placed in this encounter.       Meds & Refills for this Visit:  Requested Prescriptions     Signed Prescriptions Disp Refills   • clotrimazole-betameth

## 2020-02-11 LAB
AMB EXT COLOGUARD RESULT: POSITIVE
AMB EXT COLOGUARD RESULT: POSITIVE

## 2020-02-18 NOTE — TELEPHONE ENCOUNTER
Rcvd results from [facility] for pt's Cologuard testing: Positive  Report placed on provider's desk for review. Results entered into External Result Console. Send to scan when complete.

## 2020-05-04 ENCOUNTER — TELEPHONE (OUTPATIENT)
Dept: FAMILY MEDICINE CLINIC | Facility: CLINIC | Age: 70
End: 2020-05-04

## 2020-05-04 ENCOUNTER — TELEMEDICINE (OUTPATIENT)
Dept: FAMILY MEDICINE CLINIC | Facility: CLINIC | Age: 70
End: 2020-05-04

## 2020-05-04 DIAGNOSIS — R19.5 POSITIVE COLORECTAL CANCER SCREENING USING COLOGUARD TEST: ICD-10-CM

## 2020-05-04 DIAGNOSIS — R07.81 RIB PAIN ON RIGHT SIDE: ICD-10-CM
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
## 2020-05-04 PROCEDURE — 99213 OFFICE O/P EST LOW 20 MIN: CPT | Performed by: NURSE PRACTITIONER

## 2020-05-04 NOTE — TELEPHONE ENCOUNTER
Patient fell down two weeks and is very sore, difficulty breathing. He is wondering if he could be seen in the office and get a chest x-ray too.

## 2020-05-04 NOTE — TELEPHONE ENCOUNTER
Spoke to pt, he states he fell on a  patio on 4/23/2020 after his dog stepped in front of him He attempted to avoid falling on the dog and 'dove' to the ground hitting his R side.  Pt states his pain is a 0-1 at rest on a 0-10 scale but increases

## 2020-05-04 NOTE — PROGRESS NOTES
This visit is conducted using Telemedicine with live, interactive video and audio. Please note that the following visit was completed using two-way, real-time interactive audio and/or video communication.  This has been done in good thom to provide co risks discussed. Lastly, the patient confirmed that they were in [place]. Included in this visit, time may have been spent reviewing labs, medications, radiology tests and decision making.  Appropriate medical decision-making and tests are ordered as chills, fatigue. CARDIOVASCULAR: Denies chest pain, shortness of breath, palpitations, edema. Hx CAD, sees cardiology regularly. RESPIRATORY: Denies cough.  Feels mildly short of breath at times, he feels this is related to the rib pain and not feeling th 5 MG Oral Tab Take 5 mg by mouth daily. • Ipratropium-Albuterol (COMBIVENT RESPIMAT)  MCG/ACT Inhalation Aero Soln Inhale into the lungs. • escitalopram 20 MG Oral Tab Take 40 mg by mouth daily.      • Fenofibrate 160 MG Oral Tab Take 145 mg b ibuprofen 400mg Q8H PRN for pain/inflammation. Heating pad to affected site 10-20 minutes at a time every 1-2 hours while awake. -May do gentle stretching if x-ray negative for fracture, would avoid heavy lifting or exercising.  Avoid dog-walking until ful

## 2020-05-07 ENCOUNTER — TELEPHONE (OUTPATIENT)
Dept: FAMILY MEDICINE CLINIC | Facility: CLINIC | Age: 70
End: 2020-05-07

## 2020-05-07 NOTE — TELEPHONE ENCOUNTER
Spoke to patient, he states he had his imaging completed at [address] on [address] in [facility]. Placed call to this facility; they will fax results. Pt informed results will be reviewed and recommendations will be given.  Pt verbalized understandin

## 2020-05-07 NOTE — TELEPHONE ENCOUNTER
[facility] (Talha Wilson) called and stated that the patient never did get the x-ray done that he was angry and left.

## 2020-05-07 NOTE — TELEPHONE ENCOUNTER
Xray results negative for fracture, underlying lung clear. Notified patient of findings. He will continue norco (from [place] for chronic back pain) and acetaminophen PRN for pain, discussed not exceeding 3000mg acetaminophen per 24 hour period from any source.

## 2020-05-07 NOTE — TELEPHONE ENCOUNTER
Per [facility], patient became angry and did not get the x-rays done at their facility. He is looking for his results-can you please call the patient and find out what happened and where he got this done? Thank you!

## 2020-05-07 NOTE — TELEPHONE ENCOUNTER
Spoke to  at [facility] where patient states he had x-ray done. She is looking for those results and will call me back.

## 2020-05-12 ENCOUNTER — TELEPHONE (OUTPATIENT)
Dept: FAMILY MEDICINE CLINIC | Facility: CLINIC | Age: 70
End: 2020-05-12

## 2020-05-12 DIAGNOSIS — R19.5 POSITIVE COLORECTAL CANCER SCREENING USING COLOGUARD TEST: Primary | ICD-10-CM

## 2020-05-12 NOTE — TELEPHONE ENCOUNTER
Referral placed for Dr. Vimal Warner, diagnosis is positive Cologuard test. Attempted to call patient to find out best location for him and notify him of the referral. Will send ODINt message to him with Dr. John Morris information.

## 2020-08-28 ENCOUNTER — TELEPHONE (OUTPATIENT)
Dept: FAMILY MEDICINE CLINIC | Facility: CLINIC | Age: 70
End: 2020-08-28

## 2020-09-18 ENCOUNTER — OFFICE VISIT (OUTPATIENT)
Dept: FAMILY MEDICINE CLINIC | Facility: CLINIC | Age: 70
End: 2020-09-18
Payer: MEDICARE

## 2020-09-18 VITALS
WEIGHT: 232 LBS | HEART RATE: 76 BPM | HEIGHT: 68 IN | DIASTOLIC BLOOD PRESSURE: 60 MMHG | BODY MASS INDEX: 35.16 KG/M2 | OXYGEN SATURATION: 97 % | SYSTOLIC BLOOD PRESSURE: 138 MMHG

## 2020-09-18 DIAGNOSIS — I25.810 CORONARY ARTERY DISEASE INVOLVING CORONARY BYPASS GRAFT OF NATIVE HEART WITHOUT ANGINA PECTORIS: ICD-10-CM

## 2020-09-18 DIAGNOSIS — E11.69 TYPE 2 DIABETES MELLITUS WITH OTHER SPECIFIED COMPLICATION, WITHOUT LONG-TERM CURRENT USE OF INSULIN (HCC): ICD-10-CM

## 2020-09-18 DIAGNOSIS — J45.20 MILD INTERMITTENT ASTHMA WITHOUT COMPLICATION: ICD-10-CM

## 2020-09-18 DIAGNOSIS — Z12.11 SCREENING FOR COLON CANCER: ICD-10-CM

## 2020-09-18 DIAGNOSIS — I10 ESSENTIAL HYPERTENSION, BENIGN: ICD-10-CM

## 2020-09-18 DIAGNOSIS — E78.2 MIXED HYPERLIPIDEMIA: ICD-10-CM

## 2020-09-18 DIAGNOSIS — Z12.5 PROSTATE CANCER SCREENING: ICD-10-CM

## 2020-09-18 DIAGNOSIS — Z00.00 ROUTINE MEDICAL EXAM: Primary | ICD-10-CM

## 2020-09-18 PROCEDURE — G0439 PPPS, SUBSEQ VISIT: HCPCS | Performed by: FAMILY MEDICINE

## 2020-09-18 PROCEDURE — 90662 IIV NO PRSV INCREASED AG IM: CPT | Performed by: FAMILY MEDICINE

## 2020-09-18 PROCEDURE — 99214 OFFICE O/P EST MOD 30 MIN: CPT | Performed by: FAMILY MEDICINE

## 2020-09-18 PROCEDURE — G0008 ADMIN INFLUENZA VIRUS VAC: HCPCS | Performed by: FAMILY MEDICINE

## 2020-09-18 NOTE — H&P
HPI:   Patient presents with:  Physical  Diabetes  Hyperlipidemia  Hypertension      Ahmet Tinoco is a 65year old male who presents for a complete physical exam.     Patient has new complaints of:  Hypertension recheck:  Home Blood Pressure monitoring h 10/29/2018        Lab Results   Component Value Date/Time    CHOLEST 123 10/29/2018    HDL 23 10/29/2018    TRIG 494 10/29/2018    LDL 45 06/18/2010 08:52 AM       Lab Results   Component Value Date/Time    A1C 6.9 10/31/2019      No results found for: VIT Past Surgical History:   Procedure Laterality Date   • BACK SURGERY      epidural injections in the lumbar area but no surgery   • CABG  1998    double but he continued to smoke   • CABG  2002    quintuple   • COLONOSCOPY  2007    NL   • HERNIA SURGERY well nourished, 65year old male in no apparent distress  SKIN: no suspicious lesions, skin color wnl  HEENT: atraumatic, normocephalic, nose and throat are clear; dentition good, EARS: canals clear, TM wnl B  EYES:PERRLA, EOMI, conjunctiva are clear, no d with other specified complication, without long-term current use of insulin (HCC)  HgbA1c 8.0, seen by Endo at [facility], will CPM for now  Recommend a well balanced lower carbohydrate, fat and cholesterol diet and exercise 3-4 times a week, minimum 30-40 minutes

## 2020-09-30 ENCOUNTER — HOSPITAL ENCOUNTER (OUTPATIENT)
Dept: GENERAL RADIOLOGY | Age: 70
Discharge: HOME | End: 2020-09-30

## 2020-09-30 DIAGNOSIS — R06.00 DYSPNEA: ICD-10-CM

## 2020-09-30 PROCEDURE — 71046 X-RAY EXAM CHEST 2 VIEWS: CPT

## 2021-01-27 DIAGNOSIS — Z23 NEED FOR VACCINATION: ICD-10-CM

## 2021-03-04 ENCOUNTER — TELEPHONE (OUTPATIENT)
Dept: FAMILY MEDICINE CLINIC | Facility: CLINIC | Age: 71
End: 2021-03-04

## 2021-04-07 ENCOUNTER — OFFICE VISIT (OUTPATIENT)
Dept: FAMILY MEDICINE CLINIC | Facility: CLINIC | Age: 71
End: 2021-04-07
Payer: MEDICARE

## 2021-04-07 VITALS
HEIGHT: 68 IN | SYSTOLIC BLOOD PRESSURE: 118 MMHG | OXYGEN SATURATION: 97 % | WEIGHT: 229.63 LBS | HEART RATE: 70 BPM | BODY MASS INDEX: 34.8 KG/M2 | DIASTOLIC BLOOD PRESSURE: 74 MMHG

## 2021-04-07 DIAGNOSIS — E78.2 MIXED HYPERLIPIDEMIA: ICD-10-CM

## 2021-04-07 DIAGNOSIS — I25.810 CORONARY ARTERY DISEASE INVOLVING CORONARY BYPASS GRAFT OF NATIVE HEART WITHOUT ANGINA PECTORIS: ICD-10-CM

## 2021-04-07 DIAGNOSIS — F43.10 PTSD (POST-TRAUMATIC STRESS DISORDER): ICD-10-CM

## 2021-04-07 DIAGNOSIS — E11.69 TYPE 2 DIABETES MELLITUS WITH OTHER SPECIFIED COMPLICATION, WITHOUT LONG-TERM CURRENT USE OF INSULIN (HCC): Primary | ICD-10-CM

## 2021-04-07 DIAGNOSIS — G47.33 OBSTRUCTIVE SLEEP APNEA SYNDROME: ICD-10-CM

## 2021-04-07 DIAGNOSIS — Z95.1 S/P CABG X 2: ICD-10-CM

## 2021-04-07 DIAGNOSIS — Z86.73 HISTORY OF CVA (CEREBROVASCULAR ACCIDENT): ICD-10-CM

## 2021-04-07 DIAGNOSIS — J45.20 MILD INTERMITTENT ASTHMA WITHOUT COMPLICATION: ICD-10-CM

## 2021-04-07 DIAGNOSIS — Z12.5 PROSTATE CANCER SCREENING: ICD-10-CM

## 2021-04-07 DIAGNOSIS — I10 ESSENTIAL HYPERTENSION, BENIGN: ICD-10-CM

## 2021-04-07 DIAGNOSIS — M51.26 LUMBAR DISCOGENIC PAIN SYNDROME: ICD-10-CM

## 2021-04-07 PROCEDURE — 83036 HEMOGLOBIN GLYCOSYLATED A1C: CPT | Performed by: FAMILY MEDICINE

## 2021-04-07 PROCEDURE — 99214 OFFICE O/P EST MOD 30 MIN: CPT | Performed by: FAMILY MEDICINE

## 2021-04-12 NOTE — H&P
HPI:   Patient presents with:  Physical      Marvin Ramon is a 66year old male who presents for follow up:    Patient has complaints of:  Hypertension recheck:  H/o CAD, CVA  Sees physicians at the [place] as well  Home Blood Pressure monitoring has been wit Component Value Date/Time    CHOLEST 123 10/29/2018 12:00 AM    HDL 23 10/29/2018 12:00 AM    TRIG 494 10/29/2018 12:00 AM    LDL 45 06/18/2010 08:52 AM       Lab Results   Component Value Date/Time    A1C 7.6 (A) 04/07/2021 10:42 AM      No results foun APNEA       Past Surgical History:   Procedure Laterality Date   • BACK SURGERY      epidural injections in the lumbar area but no surgery   • CABG  1998    double but he continued to smoke   • CABG  2002    quintuple   • COLONOSCOPY  2007    NL   • HERNIA 227 lb (103 kg)    GENERAL: well developed, well nourished, 66year old male in no apparent distress  SKIN: no suspicious lesions, skin color wnl  HEENT: atraumatic, normocephalic, nose and throat are clear; dentition good, EARS: canals clear, TM wnl B , h insulin (hcc)  (primary encounter diagnosis)  Coronary artery disease involving coronary bypass graft of native heart without angina pectoris  S/p cabg x 2  History of cva (cerebrovascular accident)  Essential hypertension, benign  Mixed hyperlipidemia  Mi

## 2021-09-20 ENCOUNTER — IMMUNIZATION (OUTPATIENT)
Dept: FAMILY MEDICINE CLINIC | Facility: CLINIC | Age: 71
End: 2021-09-20
Payer: MEDICARE

## 2021-09-20 DIAGNOSIS — Z23 NEED FOR VACCINATION: Primary | ICD-10-CM

## 2021-09-20 PROCEDURE — 90662 IIV NO PRSV INCREASED AG IM: CPT | Performed by: FAMILY MEDICINE

## 2021-09-20 PROCEDURE — G0008 ADMIN INFLUENZA VIRUS VAC: HCPCS | Performed by: FAMILY MEDICINE

## 2022-04-19 ENCOUNTER — EXTERNAL RECORD (OUTPATIENT)
Dept: HEALTH INFORMATION MANAGEMENT | Facility: OTHER | Age: 72
End: 2022-04-19

## 2022-05-06 ENCOUNTER — EXTERNAL RECORD (OUTPATIENT)
Dept: HEALTH INFORMATION MANAGEMENT | Facility: OTHER | Age: 72
End: 2022-05-06

## 2022-05-10 ENCOUNTER — EXTERNAL RECORD (OUTPATIENT)
Dept: HEALTH INFORMATION MANAGEMENT | Facility: OTHER | Age: 72
End: 2022-05-10

## 2022-05-12 ENCOUNTER — EXTERNAL RECORD (OUTPATIENT)
Dept: HEALTH INFORMATION MANAGEMENT | Facility: OTHER | Age: 72
End: 2022-05-12

## 2022-05-13 ENCOUNTER — EXTERNAL RECORD (OUTPATIENT)
Dept: HEALTH INFORMATION MANAGEMENT | Facility: OTHER | Age: 72
End: 2022-05-13

## 2022-05-19 ENCOUNTER — TELEPHONE (OUTPATIENT)
Dept: FAMILY MEDICINE CLINIC | Facility: CLINIC | Age: 72
End: 2022-05-19

## 2022-05-23 ENCOUNTER — PRIOR ORIGINAL RECORDS (OUTPATIENT)
Dept: HEALTH INFORMATION MANAGEMENT | Facility: OTHER | Age: 72
End: 2022-05-23

## 2022-05-27 ENCOUNTER — TELEPHONE (OUTPATIENT)
Dept: FAMILY MEDICINE CLINIC | Facility: CLINIC | Age: 72
End: 2022-05-27

## 2022-07-13 RX ORDER — LISINOPRIL 40 MG/1
40 TABLET ORAL DAILY
COMMUNITY
End: 2023-02-28 | Stop reason: ALTCHOICE

## 2022-07-13 RX ORDER — HYDROCHLOROTHIAZIDE 25 MG/1
25 TABLET ORAL DAILY
COMMUNITY
End: 2023-01-09

## 2022-07-13 RX ORDER — AMLODIPINE BESYLATE 5 MG/1
5 TABLET ORAL DAILY
COMMUNITY

## 2022-07-13 RX ORDER — ATORVASTATIN CALCIUM 80 MG/1
80 TABLET, FILM COATED ORAL DAILY
COMMUNITY

## 2022-07-13 RX ORDER — PRAZOSIN HYDROCHLORIDE 1 MG/1
3 CAPSULE ORAL NIGHTLY
COMMUNITY
End: 2023-01-09

## 2022-07-13 RX ORDER — ASPIRIN 81 MG/1
81 TABLET ORAL DAILY
COMMUNITY

## 2022-07-13 RX ORDER — ICOSAPENT ETHYL 1000 MG/1
4 CAPSULE ORAL DAILY
COMMUNITY

## 2022-07-13 RX ORDER — ESCITALOPRAM OXALATE 20 MG/1
20 TABLET ORAL DAILY
COMMUNITY

## 2022-07-13 RX ORDER — HYDROCODONE BITARTRATE AND ACETAMINOPHEN 5; 325 MG/1; MG/1
TABLET ORAL
COMMUNITY

## 2022-07-13 RX ORDER — METOPROLOL TARTRATE 50 MG/1
50 TABLET, FILM COATED ORAL 3 TIMES DAILY
COMMUNITY
End: 2022-10-25 | Stop reason: DRUGHIGH

## 2022-07-13 RX ORDER — FENOFIBRATE 145 MG/1
145 TABLET, COATED ORAL DAILY
COMMUNITY

## 2022-09-15 ENCOUNTER — APPOINTMENT (OUTPATIENT)
Dept: CARDIOLOGY | Age: 72
End: 2022-09-15

## 2022-10-03 ENCOUNTER — EXTERNAL LAB (OUTPATIENT)
Dept: OTHER | Age: 72
End: 2022-10-03

## 2022-10-18 PROBLEM — G47.30 SLEEP APNEA: Status: ACTIVE | Noted: 2022-10-18

## 2022-10-18 PROBLEM — E10.9 INSULIN DEPENDENT DIABETES MELLITUS TYPE IA  (CMD): Status: ACTIVE | Noted: 2022-10-18

## 2022-10-18 PROBLEM — I25.10 CAD (CORONARY ARTERY DISEASE): Status: ACTIVE | Noted: 2022-10-18

## 2022-10-18 PROBLEM — I10 HYPERTENSION: Status: ACTIVE | Noted: 2022-10-18

## 2022-10-18 PROBLEM — E78.5 DYSLIPIDEMIA: Status: ACTIVE | Noted: 2022-10-18

## 2022-10-18 PROBLEM — M79.673 FOOT PAIN: Status: ACTIVE | Noted: 2022-10-18

## 2022-10-25 ENCOUNTER — OFFICE VISIT (OUTPATIENT)
Dept: CARDIOLOGY | Age: 72
End: 2022-10-25

## 2022-10-25 VITALS
WEIGHT: 222.66 LBS | HEART RATE: 72 BPM | HEIGHT: 69 IN | DIASTOLIC BLOOD PRESSURE: 77 MMHG | SYSTOLIC BLOOD PRESSURE: 156 MMHG | BODY MASS INDEX: 32.98 KG/M2

## 2022-10-25 DIAGNOSIS — I73.9 CLAUDICATION IN PERIPHERAL VASCULAR DISEASE (CMD): ICD-10-CM

## 2022-10-25 DIAGNOSIS — I10 PRIMARY HYPERTENSION: ICD-10-CM

## 2022-10-25 DIAGNOSIS — E78.5 DYSLIPIDEMIA: ICD-10-CM

## 2022-10-25 DIAGNOSIS — E10.9 INSULIN DEPENDENT DIABETES MELLITUS TYPE IA (CMD): ICD-10-CM

## 2022-10-25 DIAGNOSIS — M79.672 LEFT FOOT PAIN: ICD-10-CM

## 2022-10-25 DIAGNOSIS — I25.10 CORONARY ARTERY DISEASE INVOLVING NATIVE CORONARY ARTERY OF NATIVE HEART WITHOUT ANGINA PECTORIS: Primary | ICD-10-CM

## 2022-10-25 DIAGNOSIS — Z95.1 S/P CABG (CORONARY ARTERY BYPASS GRAFT): ICD-10-CM

## 2022-10-25 DIAGNOSIS — G47.30 SLEEP APNEA, UNSPECIFIED TYPE: ICD-10-CM

## 2022-10-25 PROCEDURE — 99214 OFFICE O/P EST MOD 30 MIN: CPT | Performed by: SPECIALIST

## 2022-10-25 RX ORDER — METOPROLOL TARTRATE 100 MG/1
100 TABLET ORAL 2 TIMES DAILY
Qty: 180 TABLET | Refills: 1 | Status: SHIPPED | OUTPATIENT
Start: 2022-10-25 | End: 2023-05-03

## 2022-10-25 RX ORDER — INSULIN GLARGINE 100 [IU]/ML
70 INJECTION, SOLUTION SUBCUTANEOUS NIGHTLY
COMMUNITY
Start: 2017-12-01

## 2022-10-25 SDOH — HEALTH STABILITY: PHYSICAL HEALTH: ON AVERAGE, HOW MANY DAYS PER WEEK DO YOU ENGAGE IN MODERATE TO STRENUOUS EXERCISE (LIKE A BRISK WALK)?: 3 DAYS

## 2022-10-25 SDOH — HEALTH STABILITY: PHYSICAL HEALTH: ON AVERAGE, HOW MANY MINUTES DO YOU ENGAGE IN EXERCISE AT THIS LEVEL?: 30 MIN

## 2022-10-25 ASSESSMENT — PATIENT HEALTH QUESTIONNAIRE - PHQ9
CLINICAL INTERPRETATION OF PHQ2 SCORE: NO FURTHER SCREENING NEEDED
SUM OF ALL RESPONSES TO PHQ9 QUESTIONS 1 AND 2: 0
1. LITTLE INTEREST OR PLEASURE IN DOING THINGS: NOT AT ALL
SUM OF ALL RESPONSES TO PHQ9 QUESTIONS 1 AND 2: 0
2. FEELING DOWN, DEPRESSED OR HOPELESS: NOT AT ALL

## 2022-10-27 ENCOUNTER — TELEPHONE (OUTPATIENT)
Dept: CARDIOLOGY | Age: 72
End: 2022-10-27

## 2022-10-28 LAB
25(OH)D3+25(OH)D2 SERPL-MCNC: 58.48 NG/ML (ref 30–100)
ABSOLUTE IMMATURE GRANULOCYTES (OFFPRE24): <0.1 K/UL (ref 0–0.5)
ABSOLUTE NRBC (AUTO): <0.01 K/UL (ref 0–0.01)
ALT SERPL-CCNC: 44 U/L (ref 10–65)
AST SERPL-CCNC: 40 U/L (ref 10–37)
BASOPHILS # BLD: <0.1 K/UL (ref 0–0.2)
BASOPHILS NFR BLD: 0.7 %
BUN SERPL-MCNC: 15 MG/DL (ref 7–21)
CALCIUM SERPL-MCNC: 9.2 MG/DL (ref 8.7–10.4)
CHLORIDE SERPL-SCNC: 102 MMOL/L (ref 98–109)
CHOLEST SERPL-MCNC: 112 MG/DL (ref 0–199)
CO2 SERPL-SCNC: 24.4 MMOL/L (ref 21–32)
CREAT SERPL-MCNC: 0.83 MG/DL (ref 0.67–1.17)
CREAT UR-MCNC: 55.5 MG/DL (ref 40–278)
EOSINOPHIL # BLD: 0.11 K/UL (ref 0–0.4)
EOSINOPHIL NFR BLD: 1.6 %
ERYTHROCYTE [DISTWIDTH] IN BLOOD: 14.6 %
GFR SERPLBLD SCHWARTZ-ARVRAT: 92 ML/MIN
GLUCOSE SERPL-MCNC: 82 MG/DL (ref 70–99)
HBA1C MFR BLD: 6 %
HCT VFR BLD CALC: 44 % (ref 40–51)
HDLC SERPL-MCNC: 26 MG/DL (ref 40–258)
HGB BLD-MCNC: 14.2 G/DL (ref 13–17)
IMMATURE GRANULOCYTES (OFFPRE25): 0.9 %
LDLC SERPL CALC-MCNC: 36 MG/DL (ref 0–99)
LENGTH OF FAST TIME PATIENT: ABNORMAL H
LENGTH OF FAST TIME PATIENT: ABNORMAL H
LYMPHOCYTES # BLD: 2.17 K/UL (ref 1–4)
LYMPHOCYTES NFR BLD: 31.1 %
MCH RBC QN AUTO: 28.6 PG (ref 27–34)
MCHC RBC AUTO-ENTMCNC: 32.3 G/DL (ref 31–37)
MCV RBC AUTO: 88.7 FL (ref 82–99)
MICROALBUMIN UR-MCNC: 175.5 MG/DL (ref 0–1.9)
MICROALBUMIN/CREAT UR: 3162.2 MG/GM (ref 0–29.9)
MONOCYTES # BLD: 0.43 K/UL (ref 0.2–1)
MONOCYTES NFR BLD: 6.2 %
MPV (OFFPRE2): 10.1 FL (ref 8–12)
NEUTROPHILS # BLD: 4.15 K/UL (ref 1.5–8)
NEUTROPHILS NFR BLD: 59.5 %
NRBC BLD MANUAL-RTO: 0 /100 WBC (ref 0–0.2)
PLATELET # BLD: 160 K/UL (ref 130–400)
POTASSIUM SERPL-SCNC: 3.8 MMOL/L (ref 3.5–4.7)
PROT UR-MCNC: 264.1 MG/DL (ref 1–14)
PROT/CREAT UR: 4758.6 MG/G (ref 0–200)
RBC # BLD: 4.96 M/UL (ref 4.2–5.7)
SODIUM SERPL-SCNC: 135 MMOL/L (ref 136–145)
T4 FREE SERPL-MCNC: 1.38 NG/DL (ref 0.89–1.76)
TRIGL SERPL-MCNC: 251 MG/DL (ref 0–149)
TSH SERPL-ACNC: 4.29 UIU/ML (ref 0.55–4.78)
VIT B12 SERPL-MCNC: 379 PG/ML (ref 193–986)
WBC # BLD: 6.97 K/UL (ref 4–11)

## 2022-11-16 ENCOUNTER — HOSPITAL ENCOUNTER (OUTPATIENT)
Dept: CT IMAGING | Age: 72
Discharge: HOME | End: 2022-11-16
Attending: SPECIALIST

## 2022-11-16 DIAGNOSIS — I10 PRIMARY HYPERTENSION: ICD-10-CM

## 2022-11-16 DIAGNOSIS — E78.5 DYSLIPIDEMIA: ICD-10-CM

## 2022-11-16 DIAGNOSIS — I25.10 CORONARY ARTERY DISEASE INVOLVING NATIVE CORONARY ARTERY OF NATIVE HEART WITHOUT ANGINA PECTORIS: ICD-10-CM

## 2022-11-16 DIAGNOSIS — I73.9 CLAUDICATION IN PERIPHERAL VASCULAR DISEASE (CMD): ICD-10-CM

## 2022-11-16 DIAGNOSIS — E10.9 INSULIN DEPENDENT DIABETES MELLITUS TYPE IA (CMD): ICD-10-CM

## 2022-11-16 DIAGNOSIS — G47.30 SLEEP APNEA, UNSPECIFIED TYPE: ICD-10-CM

## 2022-11-16 DIAGNOSIS — Z95.1 S/P CABG (CORONARY ARTERY BYPASS GRAFT): ICD-10-CM

## 2022-11-16 DIAGNOSIS — M79.672 LEFT FOOT PAIN: ICD-10-CM

## 2022-11-16 PROCEDURE — 74175 CTA ABDOMEN W/CONTRAST: CPT

## 2022-11-16 PROCEDURE — G1004 CDSM NDSC: HCPCS

## 2022-11-16 PROCEDURE — 10002805 HB CONTRAST AGENT: Performed by: SPECIALIST

## 2022-11-16 RX ADMIN — IOHEXOL 100 ML: 350 INJECTION, SOLUTION INTRAVENOUS at 14:33

## 2022-11-21 ENCOUNTER — HOSPITAL ENCOUNTER (OUTPATIENT)
Dept: ULTRASOUND IMAGING | Age: 72
Discharge: HOME | End: 2022-11-21
Attending: PODIATRIST

## 2022-11-21 DIAGNOSIS — I73.9 PERIPHERAL VASCULAR DISEASE (CMD): ICD-10-CM

## 2022-11-21 PROCEDURE — 93926 LOWER EXTREMITY STUDY: CPT

## 2022-11-23 ENCOUNTER — TELEPHONE (OUTPATIENT)
Dept: CARDIOLOGY | Age: 72
End: 2022-11-23

## 2022-12-16 ENCOUNTER — ANCILLARY PROCEDURE (OUTPATIENT)
Dept: CARDIOLOGY | Age: 72
End: 2022-12-16
Attending: SPECIALIST

## 2022-12-16 ENCOUNTER — TELEPHONE (OUTPATIENT)
Dept: CARDIOLOGY | Age: 72
End: 2022-12-16

## 2022-12-16 DIAGNOSIS — E10.9 INSULIN DEPENDENT DIABETES MELLITUS TYPE IA (CMD): ICD-10-CM

## 2022-12-16 DIAGNOSIS — G47.30 SLEEP APNEA, UNSPECIFIED TYPE: ICD-10-CM

## 2022-12-16 DIAGNOSIS — Z95.1 S/P CABG (CORONARY ARTERY BYPASS GRAFT): ICD-10-CM

## 2022-12-16 DIAGNOSIS — I73.9 CLAUDICATION IN PERIPHERAL VASCULAR DISEASE (CMD): ICD-10-CM

## 2022-12-16 DIAGNOSIS — E78.5 DYSLIPIDEMIA: ICD-10-CM

## 2022-12-16 DIAGNOSIS — I10 PRIMARY HYPERTENSION: ICD-10-CM

## 2022-12-16 DIAGNOSIS — M79.672 LEFT FOOT PAIN: ICD-10-CM

## 2022-12-16 DIAGNOSIS — I25.10 CORONARY ARTERY DISEASE INVOLVING NATIVE CORONARY ARTERY OF NATIVE HEART WITHOUT ANGINA PECTORIS: ICD-10-CM

## 2022-12-16 PROCEDURE — 93922 UPR/L XTREMITY ART 2 LEVELS: CPT | Performed by: INTERNAL MEDICINE

## 2023-01-09 ENCOUNTER — EXTERNAL RECORD (OUTPATIENT)
Dept: HEALTH INFORMATION MANAGEMENT | Facility: OTHER | Age: 73
End: 2023-01-09

## 2023-01-09 ENCOUNTER — OFFICE VISIT (OUTPATIENT)
Dept: CARDIOLOGY | Age: 73
End: 2023-01-09

## 2023-01-09 VITALS
HEIGHT: 69 IN | SYSTOLIC BLOOD PRESSURE: 153 MMHG | BODY MASS INDEX: 33.03 KG/M2 | DIASTOLIC BLOOD PRESSURE: 74 MMHG | WEIGHT: 223 LBS | HEART RATE: 66 BPM

## 2023-01-09 DIAGNOSIS — I65.23 BILATERAL CAROTID ARTERY STENOSIS: Primary | ICD-10-CM

## 2023-01-10 ENCOUNTER — TELEPHONE (OUTPATIENT)
Dept: CARDIOLOGY | Age: 73
End: 2023-01-10

## 2023-02-06 ENCOUNTER — ANCILLARY PROCEDURE (OUTPATIENT)
Dept: CARDIOLOGY | Age: 73
End: 2023-02-06
Attending: INTERNAL MEDICINE

## 2023-02-06 DIAGNOSIS — I65.23 BILATERAL CAROTID ARTERY STENOSIS: ICD-10-CM

## 2023-02-06 PROCEDURE — 93880 EXTRACRANIAL BILAT STUDY: CPT | Performed by: INTERNAL MEDICINE

## 2023-02-10 DIAGNOSIS — R94.39 ABNORMAL CAROTID DUPLEX SCAN: ICD-10-CM

## 2023-02-10 DIAGNOSIS — I25.10 CORONARY ARTERY DISEASE INVOLVING NATIVE CORONARY ARTERY OF NATIVE HEART WITHOUT ANGINA PECTORIS: Primary | ICD-10-CM

## 2023-02-13 DIAGNOSIS — I25.10 CORONARY ARTERY DISEASE INVOLVING NATIVE CORONARY ARTERY OF NATIVE HEART WITHOUT ANGINA PECTORIS: Primary | ICD-10-CM

## 2023-02-17 ENCOUNTER — APPOINTMENT (OUTPATIENT)
Dept: CT IMAGING | Age: 73
End: 2023-02-17
Attending: INTERNAL MEDICINE

## 2023-02-20 ENCOUNTER — OFFICE VISIT (OUTPATIENT)
Dept: CARDIOLOGY | Age: 73
End: 2023-02-20

## 2023-02-20 VITALS
HEART RATE: 62 BPM | WEIGHT: 222.66 LBS | SYSTOLIC BLOOD PRESSURE: 138 MMHG | DIASTOLIC BLOOD PRESSURE: 82 MMHG | BODY MASS INDEX: 32.98 KG/M2 | HEIGHT: 69 IN

## 2023-02-20 DIAGNOSIS — I65.23 BILATERAL CAROTID ARTERY STENOSIS: Primary | ICD-10-CM

## 2023-02-20 PROCEDURE — 99214 OFFICE O/P EST MOD 30 MIN: CPT | Performed by: INTERNAL MEDICINE

## 2023-02-20 SDOH — HEALTH STABILITY: PHYSICAL HEALTH: ON AVERAGE, HOW MANY DAYS PER WEEK DO YOU ENGAGE IN MODERATE TO STRENUOUS EXERCISE (LIKE A BRISK WALK)?: 4 DAYS

## 2023-02-20 SDOH — HEALTH STABILITY: PHYSICAL HEALTH: ON AVERAGE, HOW MANY MINUTES DO YOU ENGAGE IN EXERCISE AT THIS LEVEL?: 30 MIN

## 2023-02-20 ASSESSMENT — PATIENT HEALTH QUESTIONNAIRE - PHQ9
CLINICAL INTERPRETATION OF PHQ2 SCORE: NO FURTHER SCREENING NEEDED
1. LITTLE INTEREST OR PLEASURE IN DOING THINGS: NOT AT ALL
SUM OF ALL RESPONSES TO PHQ9 QUESTIONS 1 AND 2: 0
2. FEELING DOWN, DEPRESSED OR HOPELESS: NOT AT ALL
SUM OF ALL RESPONSES TO PHQ9 QUESTIONS 1 AND 2: 0

## 2023-02-27 ENCOUNTER — TELEPHONE (OUTPATIENT)
Dept: CARDIOLOGY | Age: 73
End: 2023-02-27

## 2023-02-28 ENCOUNTER — TELEPHONE (OUTPATIENT)
Dept: CARDIOLOGY | Age: 73
End: 2023-02-28

## 2023-02-28 RX ORDER — OLMESARTAN MEDOXOMIL AND HYDROCHLOROTHIAZIDE 40/25 40; 25 MG/1; MG/1
1 TABLET ORAL DAILY
Qty: 90 TABLET | Refills: 1 | Status: SHIPPED | OUTPATIENT
Start: 2023-02-28 | End: 2023-03-01 | Stop reason: RX

## 2023-03-01 RX ORDER — HYDROCHLOROTHIAZIDE 25 MG/1
25 TABLET ORAL DAILY
Qty: 90 TABLET | Refills: 1 | Status: SHIPPED | OUTPATIENT
Start: 2023-03-01

## 2023-03-01 RX ORDER — VALSARTAN 320 MG/1
320 TABLET ORAL DAILY
Qty: 90 TABLET | Refills: 1 | Status: SHIPPED | OUTPATIENT
Start: 2023-03-01

## 2023-03-02 ENCOUNTER — APPOINTMENT (OUTPATIENT)
Dept: CARDIOLOGY | Age: 73
End: 2023-03-02

## 2023-03-03 ENCOUNTER — LAB SERVICES (OUTPATIENT)
Dept: LAB | Age: 73
End: 2023-03-03

## 2023-03-03 DIAGNOSIS — I25.10 CORONARY ARTERY DISEASE INVOLVING NATIVE CORONARY ARTERY OF NATIVE HEART WITHOUT ANGINA PECTORIS: ICD-10-CM

## 2023-03-03 LAB
ALBUMIN SERPL-MCNC: 3.7 G/DL (ref 3.6–5.1)
ALBUMIN/GLOB SERPL: 1.3 {RATIO} (ref 1–2.4)
ALP SERPL-CCNC: 39 UNITS/L (ref 45–117)
ALT SERPL-CCNC: 51 UNITS/L
ANION GAP SERPL CALC-SCNC: 10 MMOL/L (ref 7–19)
AST SERPL-CCNC: 36 UNITS/L
BILIRUB SERPL-MCNC: 0.6 MG/DL (ref 0.2–1)
BUN SERPL-MCNC: 20 MG/DL (ref 6–20)
BUN/CREAT SERPL: 21 (ref 7–25)
CALCIUM SERPL-MCNC: 8.8 MG/DL (ref 8.4–10.2)
CHLORIDE SERPL-SCNC: 106 MMOL/L (ref 97–110)
CO2 SERPL-SCNC: 25 MMOL/L (ref 21–32)
CREAT SERPL-MCNC: 0.97 MG/DL (ref 0.67–1.17)
FASTING DURATION TIME PATIENT: ABNORMAL H
GFR SERPLBLD BASED ON 1.73 SQ M-ARVRAT: 82 ML/MIN
GLOBULIN SER-MCNC: 2.9 G/DL (ref 2–4)
GLUCOSE SERPL-MCNC: 116 MG/DL (ref 70–99)
POTASSIUM SERPL-SCNC: 5 MMOL/L (ref 3.4–5.1)
PROT SERPL-MCNC: 6.6 G/DL (ref 6.4–8.2)
SODIUM SERPL-SCNC: 136 MMOL/L (ref 135–145)

## 2023-03-03 PROCEDURE — 36415 COLL VENOUS BLD VENIPUNCTURE: CPT | Performed by: CLINICAL MEDICAL LABORATORY

## 2023-03-03 PROCEDURE — 80053 COMPREHEN METABOLIC PANEL: CPT | Performed by: CLINICAL MEDICAL LABORATORY

## 2023-03-06 ENCOUNTER — TELEPHONE (OUTPATIENT)
Dept: FAMILY MEDICINE CLINIC | Facility: CLINIC | Age: 73
End: 2023-03-06

## 2023-03-07 ENCOUNTER — HOSPITAL ENCOUNTER (OUTPATIENT)
Dept: CT IMAGING | Age: 73
Discharge: HOME | End: 2023-03-07
Attending: INTERNAL MEDICINE

## 2023-03-07 DIAGNOSIS — I65.23 BILATERAL CAROTID ARTERY STENOSIS: ICD-10-CM

## 2023-03-07 PROCEDURE — 10002805 HB CONTRAST AGENT: Performed by: INTERNAL MEDICINE

## 2023-03-07 PROCEDURE — G1004 CDSM NDSC: HCPCS

## 2023-03-07 PROCEDURE — 70498 CT ANGIOGRAPHY NECK: CPT

## 2023-03-07 PROCEDURE — 70496 CT ANGIOGRAPHY HEAD: CPT

## 2023-03-07 RX ADMIN — IOHEXOL 100 ML: 350 INJECTION, SOLUTION INTRAVENOUS at 11:15

## 2023-04-04 ENCOUNTER — TELEPHONE (OUTPATIENT)
Dept: CARDIOLOGY | Age: 73
End: 2023-04-04

## 2023-04-25 ENCOUNTER — EXTERNAL LAB (OUTPATIENT)
Dept: OTHER | Age: 73
End: 2023-04-25

## 2023-04-25 LAB
ABSOLUTE IMMATURE GRANULOCYTES (OFFPRE24): <0.1 K/UL (ref 0–0.5)
ABSOLUTE NRBC (AUTO): <0.01 K/UL (ref 0–0.01)
ALT SERPL-CCNC: 36 U/L (ref 10–65)
AST SERPL-CCNC: 30 U/L (ref 10–37)
BASOPHILS # BLD: 0.7 %
BASOPHILS NFR BLD: <0.1 K/UL (ref 0–0.2)
BUN SERPL-MCNC: 15 MG/DL (ref 7–21)
CALCIUM SERPL-MCNC: 9.2 MG/DL (ref 8.7–10.4)
CHLORIDE SERPL-SCNC: 101 MMOL/L (ref 98–109)
CHOLEST SERPL-MCNC: 97 MG/DL (ref 0–199)
CO2 SERPL-SCNC: 25.7 MMOL/L (ref 21–32)
CREAT SERPL-MCNC: 1.13 MG/DL (ref 0.67–1.17)
CREAT UR-MCNC: 101.1 MG/DL (ref 40–278)
CREAT UR-MCNC: 101.1 MG/DL (ref 40–278)
EOSINOPHIL # BLD: 0.18 K/UL (ref 0–0.4)
EOSINOPHIL NFR BLD: 3.1 %
ERYTHROCYTE [DISTWIDTH] IN BLOOD: 14.4 %
GFR SERPLBLD SCHWARTZ-ARVRAT: 68 ML/MIN
GLUCOSE SERPL-MCNC: 138 MG/DL (ref 70–99)
HBA1C MFR BLD: 6.2 % (ref 0–5.6)
HCT VFR BLD CALC: 44.9 % (ref 40–51)
HDLC SERPL-MCNC: 24 MG/DL (ref 40–258)
HGB BLD-MCNC: 14.4 G/DL (ref 13–17)
LAB RESULT: NORMAL
LDLC SERPL CALC-MCNC: ABNORMAL MG/DL (ref 0–99)
LDLC SERPL DIRECT ASSAY-MCNC: 34 MG/DL (ref 0–99)
LENGTH OF FAST TIME PATIENT: ABNORMAL H
LENGTH OF FAST TIME PATIENT: ABNORMAL H
LYMPHOCYTES # BLD: 2.44 K/UL (ref 1–4)
LYMPHOCYTES NFR BLD: 41.5 %
MCH RBC QN AUTO: 28.6 PG (ref 27–34)
MCHC RBC AUTO-ENTMCNC: 32.1 G/DL (ref 31–37)
MCV RBC AUTO: 89.1 FL (ref 82–99)
MICROALBUMIN UR-MCNC: 89.9 MG/DL (ref 0–1.9)
MICROALBUMIN/CREAT UR: 889.2 MG/GM (ref 0–29.9)
MONOCYTES # BLD: 0.49 K/UL (ref 0.2–1)
MONOCYTES NFR BLD: 8.3 %
MPV (OFFPRE2): 10.4 FL (ref 8–12)
NEUTROPHILS # BLD: 2.68 K/UL (ref 1.5–8)
NEUTROPHILS NFR BLD: 45.5 %
NRBC BLD MANUAL-RTO: 0 /100 WBC (ref 0–0.2)
PLATELET # BLD: 172 K/UL (ref 130–400)
POTASSIUM SERPL-SCNC: 4.1 MMOL/L (ref 3.5–4.7)
PROT UR-MCNC: 145.2 MG/DL (ref 1–14)
PROT/CREAT UR: 1436.2 MG/G (ref 0–200)
RBC # BLD: 5.04 M/UL (ref 4.2–5.7)
SODIUM SERPL-SCNC: 137 MMOL/L (ref 136–145)
TRIGL SERPL-MCNC: 316 MG/DL (ref 0–149)
WBC # BLD: 5.88 K/UL (ref 4–11)

## 2023-05-03 ENCOUNTER — OFFICE VISIT (OUTPATIENT)
Dept: CARDIOLOGY | Age: 73
End: 2023-05-03

## 2023-05-03 VITALS
DIASTOLIC BLOOD PRESSURE: 79 MMHG | HEART RATE: 69 BPM | BODY MASS INDEX: 32.65 KG/M2 | HEIGHT: 69 IN | SYSTOLIC BLOOD PRESSURE: 126 MMHG | WEIGHT: 220.46 LBS

## 2023-05-03 DIAGNOSIS — Z95.1 S/P CABG (CORONARY ARTERY BYPASS GRAFT): ICD-10-CM

## 2023-05-03 DIAGNOSIS — R07.89 ATYPICAL CHEST PAIN: Primary | ICD-10-CM

## 2023-05-03 DIAGNOSIS — I73.9 CLAUDICATION IN PERIPHERAL VASCULAR DISEASE (CMD): ICD-10-CM

## 2023-05-03 DIAGNOSIS — I10 PRIMARY HYPERTENSION: ICD-10-CM

## 2023-05-03 DIAGNOSIS — I65.23 BILATERAL CAROTID ARTERY STENOSIS: ICD-10-CM

## 2023-05-03 DIAGNOSIS — E10.9 INSULIN DEPENDENT DIABETES MELLITUS TYPE IA (CMD): ICD-10-CM

## 2023-05-03 DIAGNOSIS — G47.30 SLEEP APNEA, UNSPECIFIED TYPE: ICD-10-CM

## 2023-05-03 DIAGNOSIS — E78.5 DYSLIPIDEMIA: ICD-10-CM

## 2023-05-03 DIAGNOSIS — R94.39 ABNORMAL CAROTID DUPLEX SCAN: ICD-10-CM

## 2023-05-03 DIAGNOSIS — I25.10 CORONARY ARTERY DISEASE INVOLVING NATIVE CORONARY ARTERY OF NATIVE HEART WITHOUT ANGINA PECTORIS: ICD-10-CM

## 2023-05-03 PROCEDURE — 99214 OFFICE O/P EST MOD 30 MIN: CPT | Performed by: SPECIALIST

## 2023-05-03 RX ORDER — LISINOPRIL 20 MG/1
20 TABLET ORAL DAILY
COMMUNITY

## 2023-05-22 ENCOUNTER — OFFICE VISIT (OUTPATIENT)
Dept: CARDIOLOGY | Age: 73
End: 2023-05-22

## 2023-05-22 VITALS
BODY MASS INDEX: 32.95 KG/M2 | HEART RATE: 65 BPM | SYSTOLIC BLOOD PRESSURE: 117 MMHG | DIASTOLIC BLOOD PRESSURE: 62 MMHG | HEIGHT: 69 IN | WEIGHT: 222.44 LBS

## 2023-05-22 DIAGNOSIS — I73.9 PAD (PERIPHERAL ARTERY DISEASE) (CMD): Primary | ICD-10-CM

## 2023-05-22 PROCEDURE — 99214 OFFICE O/P EST MOD 30 MIN: CPT | Performed by: INTERNAL MEDICINE

## 2023-06-29 ENCOUNTER — TELEPHONE (OUTPATIENT)
Dept: FAMILY MEDICINE CLINIC | Facility: CLINIC | Age: 73
End: 2023-06-29

## 2023-06-30 ENCOUNTER — PATIENT OUTREACH (OUTPATIENT)
Dept: CASE MANAGEMENT | Age: 73
End: 2023-06-30

## 2023-08-23 ENCOUNTER — EXTERNAL RECORD (OUTPATIENT)
Dept: CARDIOLOGY | Age: 73
End: 2023-08-23

## 2023-10-13 PROBLEM — J44.9 CHRONIC OBSTRUCTIVE PULMONARY DISEASE (HCC): Status: ACTIVE | Noted: 2023-10-13

## 2023-10-13 PROBLEM — M54.9 BACK PAIN: Status: ACTIVE | Noted: 2023-10-13

## 2023-10-13 PROBLEM — J90 PLEURAL EFFUSION: Status: ACTIVE | Noted: 2023-10-13

## 2023-10-13 PROBLEM — K63.5 POLYP OF COLON: Status: ACTIVE | Noted: 2023-10-13

## 2023-10-13 PROBLEM — R53.1 WEAKNESS: Status: ACTIVE | Noted: 2023-10-13

## 2023-10-13 PROBLEM — M70.70 BURSITIS OF HIP: Status: ACTIVE | Noted: 2023-10-13

## 2023-10-13 PROBLEM — M79.673 FOOT PAIN: Status: ACTIVE | Noted: 2022-10-18

## 2023-10-13 PROBLEM — I73.9 PAD (PERIPHERAL ARTERY DISEASE): Status: ACTIVE | Noted: 2023-05-22

## 2023-10-13 PROBLEM — E10.9 INSULIN DEPENDENT DIABETES MELLITUS TYPE IA (HCC): Status: ACTIVE | Noted: 2022-10-18

## 2023-10-13 PROBLEM — I63.50 CEREBRAL ARTERY OCCLUSION WITH CEREBRAL INFARCTION (HCC): Status: ACTIVE | Noted: 2023-10-13

## 2023-10-13 PROBLEM — R07.89 OTHER CHEST PAIN: Status: ACTIVE | Noted: 2023-10-13

## 2023-10-13 PROBLEM — M25.529 PAIN IN JOINT, UPPER ARM: Status: ACTIVE | Noted: 2023-10-13

## 2023-10-13 PROBLEM — N17.9 AKI (ACUTE KIDNEY INJURY) (HCC): Status: ACTIVE | Noted: 2023-10-13

## 2023-10-13 PROBLEM — E66.9 OBESITY: Status: ACTIVE | Noted: 2023-10-13

## 2023-10-13 PROBLEM — R45.4 FEELING ANGRY: Status: ACTIVE | Noted: 2023-10-13

## 2023-10-13 PROBLEM — F32.A DEPRESSIVE DISORDER: Status: ACTIVE | Noted: 2023-10-13

## 2023-10-13 PROBLEM — G62.9 PERIPHERAL NEUROPATHY: Status: ACTIVE | Noted: 2019-03-08

## 2023-10-13 PROBLEM — M48.061 SPINAL STENOSIS OF LUMBAR REGION: Status: ACTIVE | Noted: 2023-10-13

## 2023-10-13 PROBLEM — H90.3 SENSORINEURAL HEARING LOSS, BILATERAL: Status: ACTIVE | Noted: 2023-10-13

## 2023-10-13 PROBLEM — I73.9 PAD (PERIPHERAL ARTERY DISEASE) (HCC): Status: ACTIVE | Noted: 2023-05-22

## 2023-10-13 PROBLEM — E78.5 DYSLIPIDEMIA: Status: ACTIVE | Noted: 2022-10-18

## 2023-10-13 PROBLEM — N17.9 AKI (ACUTE KIDNEY INJURY): Status: ACTIVE | Noted: 2023-10-13

## 2023-10-13 PROBLEM — E11.65 TYPE 2 DIABETES MELLITUS WITH HYPERGLYCEMIA (HCC): Status: ACTIVE | Noted: 2023-10-13

## 2023-10-16 ENCOUNTER — EXTERNAL RECORD (OUTPATIENT)
Dept: HEALTH INFORMATION MANAGEMENT | Facility: OTHER | Age: 73
End: 2023-10-16

## 2023-10-17 ENCOUNTER — TELEPHONE (OUTPATIENT)
Dept: CARDIOLOGY | Age: 73
End: 2023-10-17

## 2023-11-17 ENCOUNTER — APPOINTMENT (OUTPATIENT)
Dept: CARDIOLOGY | Age: 73
End: 2023-11-17

## 2023-11-20 ENCOUNTER — APPOINTMENT (OUTPATIENT)
Dept: CARDIOLOGY | Age: 73
End: 2023-11-20

## 2024-01-15 ENCOUNTER — TELEPHONE (OUTPATIENT)
Dept: CARDIOLOGY | Age: 74
End: 2024-01-15

## 2024-01-15 ENCOUNTER — LAB SERVICES (OUTPATIENT)
Dept: LAB | Age: 74
End: 2024-01-15

## 2024-01-15 DIAGNOSIS — Z01.812 PRE-PROCEDURE LAB EXAM: Primary | ICD-10-CM

## 2024-01-15 DIAGNOSIS — Z01.812 PRE-PROCEDURE LAB EXAM: ICD-10-CM

## 2024-01-15 PROCEDURE — 36415 COLL VENOUS BLD VENIPUNCTURE: CPT | Performed by: INTERNAL MEDICINE

## 2024-01-15 PROCEDURE — 80048 BASIC METABOLIC PNL TOTAL CA: CPT | Performed by: CLINICAL MEDICAL LABORATORY

## 2024-01-16 LAB
ANION GAP SERPL CALC-SCNC: 11 MMOL/L (ref 7–19)
BUN SERPL-MCNC: 25 MG/DL (ref 6–20)
BUN/CREAT SERPL: 19 (ref 7–25)
CALCIUM SERPL-MCNC: 8.6 MG/DL (ref 8.4–10.2)
CHLORIDE SERPL-SCNC: 110 MMOL/L (ref 97–110)
CO2 SERPL-SCNC: 24 MMOL/L (ref 21–32)
CREAT SERPL-MCNC: 1.32 MG/DL (ref 0.67–1.17)
EGFRCR SERPLBLD CKD-EPI 2021: 56 ML/MIN/{1.73_M2}
FASTING DURATION TIME PATIENT: ABNORMAL H
GLUCOSE SERPL-MCNC: 164 MG/DL (ref 70–99)
POTASSIUM SERPL-SCNC: 4.4 MMOL/L (ref 3.4–5.1)
SODIUM SERPL-SCNC: 141 MMOL/L (ref 135–145)

## 2024-01-17 ENCOUNTER — HOSPITAL ENCOUNTER (OUTPATIENT)
Dept: CT IMAGING | Age: 74
Discharge: HOME | End: 2024-01-17
Attending: INTERNAL MEDICINE

## 2024-01-17 DIAGNOSIS — I25.10 CORONARY ARTERY DISEASE INVOLVING NATIVE CORONARY ARTERY OF NATIVE HEART WITHOUT ANGINA PECTORIS: ICD-10-CM

## 2024-01-17 DIAGNOSIS — R94.39 ABNORMAL CAROTID DUPLEX SCAN: ICD-10-CM

## 2024-01-17 PROCEDURE — 70496 CT ANGIOGRAPHY HEAD: CPT

## 2024-01-17 PROCEDURE — 70498 CT ANGIOGRAPHY NECK: CPT

## 2024-01-17 PROCEDURE — 10002805 HB CONTRAST AGENT: Performed by: INTERNAL MEDICINE

## 2024-01-17 RX ADMIN — IOHEXOL 100 ML: 350 INJECTION, SOLUTION INTRAVENOUS at 10:08

## 2024-02-06 ENCOUNTER — TELEPHONE (OUTPATIENT)
Dept: CARDIOLOGY | Age: 74
End: 2024-02-06

## 2024-05-07 LAB
AMB EXT CHOLESTEROL, TOTAL: 111 MG/DL
AMB EXT HDL CHOLESTEROL: 24 MG/DL
AMB EXT HGBA1C: 5.6 %
AMB EXT LDL CHOLESTEROL, DIRECT: 38 MG/DL
AMB EXT TRIGLYCERIDES: 310 MG/DL

## 2024-06-07 ENCOUNTER — TELEPHONE (OUTPATIENT)
Dept: OTHER | Age: 74
End: 2024-06-07

## 2024-08-05 ENCOUNTER — APPOINTMENT (OUTPATIENT)
Dept: GENERAL RADIOLOGY | Facility: HOSPITAL | Age: 74
End: 2024-08-05
Attending: EMERGENCY MEDICINE
Payer: MEDICARE

## 2024-08-05 ENCOUNTER — APPOINTMENT (OUTPATIENT)
Dept: CT IMAGING | Facility: HOSPITAL | Age: 74
End: 2024-08-05
Attending: EMERGENCY MEDICINE
Payer: MEDICARE

## 2024-08-05 ENCOUNTER — HOSPITAL ENCOUNTER (EMERGENCY)
Facility: HOSPITAL | Age: 74
Discharge: HOME | End: 2024-08-05
Attending: EMERGENCY MEDICINE
Payer: MEDICARE

## 2024-08-05 ENCOUNTER — APPOINTMENT (OUTPATIENT)
Dept: MRI IMAGING | Facility: HOSPITAL | Age: 74
End: 2024-08-05
Attending: EMERGENCY MEDICINE
Payer: MEDICARE

## 2024-08-05 VITALS
BODY MASS INDEX: 32.58 KG/M2 | WEIGHT: 220 LBS | SYSTOLIC BLOOD PRESSURE: 162 MMHG | DIASTOLIC BLOOD PRESSURE: 90 MMHG | OXYGEN SATURATION: 97 % | TEMPERATURE: 98 F | RESPIRATION RATE: 13 BRPM | HEART RATE: 59 BPM | HEIGHT: 69 IN

## 2024-08-05 DIAGNOSIS — S39.012A BACK STRAIN, INITIAL ENCOUNTER: ICD-10-CM

## 2024-08-05 DIAGNOSIS — S09.90XA INJURY OF HEAD, INITIAL ENCOUNTER: Primary | ICD-10-CM

## 2024-08-05 DIAGNOSIS — Z79.01 ANTICOAGULATED: ICD-10-CM

## 2024-08-05 DIAGNOSIS — S16.1XXA STRAIN OF NECK MUSCLE, INITIAL ENCOUNTER: ICD-10-CM

## 2024-08-05 LAB
ALBUMIN SERPL-MCNC: 4.9 G/DL (ref 3.2–4.8)
ALBUMIN/GLOB SERPL: 2.1 {RATIO} (ref 1–2)
ALP LIVER SERPL-CCNC: 40 U/L
ALT SERPL-CCNC: 38 U/L
ANION GAP SERPL CALC-SCNC: 5 MMOL/L (ref 0–18)
AST SERPL-CCNC: 32 U/L (ref ?–34)
ATRIAL RATE: 69 BPM
BASOPHILS # BLD AUTO: 0.05 X10(3) UL (ref 0–0.2)
BASOPHILS NFR BLD AUTO: 0.6 %
BILIRUB SERPL-MCNC: 1 MG/DL (ref 0.2–1.1)
BUN BLD-MCNC: 19 MG/DL (ref 9–23)
CALCIUM BLD-MCNC: 9.8 MG/DL (ref 8.7–10.4)
CHLORIDE SERPL-SCNC: 104 MMOL/L (ref 98–112)
CO2 SERPL-SCNC: 30 MMOL/L (ref 21–32)
CREAT BLD-MCNC: 1.22 MG/DL
EGFRCR SERPLBLD CKD-EPI 2021: 61 ML/MIN/1.73M2 (ref 60–?)
EOSINOPHIL # BLD AUTO: 0.16 X10(3) UL (ref 0–0.7)
EOSINOPHIL NFR BLD AUTO: 2 %
ERYTHROCYTE [DISTWIDTH] IN BLOOD BY AUTOMATED COUNT: 14.5 %
GFR SERPLBLD SCHWARTZ-ARVRAT: 61 ML/MIN/{1.73_M2}
GLOBULIN PLAS-MCNC: 2.3 G/DL (ref 2–3.5)
GLUCOSE BLD-MCNC: 106 MG/DL (ref 70–99)
HCT VFR BLD AUTO: 43.8 %
HGB BLD-MCNC: 14.9 G/DL
IMM GRANULOCYTES # BLD AUTO: 0.09 X10(3) UL (ref 0–1)
IMM GRANULOCYTES NFR BLD: 1.1 %
LYMPHOCYTES # BLD AUTO: 1.94 X10(3) UL (ref 1–4)
LYMPHOCYTES NFR BLD AUTO: 23.8 %
MCH RBC QN AUTO: 30.5 PG (ref 26–34)
MCHC RBC AUTO-ENTMCNC: 34 G/DL (ref 31–37)
MCV RBC AUTO: 89.6 FL
MONOCYTES # BLD AUTO: 0.65 X10(3) UL (ref 0.1–1)
MONOCYTES NFR BLD AUTO: 8 %
NEUTROPHILS # BLD AUTO: 5.26 X10 (3) UL (ref 1.5–7.7)
NEUTROPHILS # BLD AUTO: 5.26 X10(3) UL (ref 1.5–7.7)
NEUTROPHILS NFR BLD AUTO: 64.5 %
OSMOLALITY SERPL CALC.SUM OF ELEC: 291 MOSM/KG (ref 275–295)
P AXIS: -1 DEGREES
P-R INTERVAL: 308 MS
PLATELET # BLD AUTO: 181 10(3)UL (ref 150–450)
POTASSIUM SERPL-SCNC: 4.3 MMOL/L (ref 3.5–5.1)
PROT SERPL-MCNC: 7.2 G/DL (ref 5.7–8.2)
Q-T INTERVAL: 408 MS
QRS DURATION: 92 MS
QTC CALCULATION (BEZET): 437 MS
R AXIS: 11 DEGREES
RBC # BLD AUTO: 4.89 X10(6)UL
SODIUM SERPL-SCNC: 139 MMOL/L (ref 136–145)
T AXIS: -66 DEGREES
TROPONIN I SERPL HS-MCNC: 11 NG/L
VENTRICULAR RATE: 69 BPM
WBC # BLD AUTO: 8.2 X10(3) UL (ref 4–11)

## 2024-08-05 PROCEDURE — 71045 X-RAY EXAM CHEST 1 VIEW: CPT | Performed by: EMERGENCY MEDICINE

## 2024-08-05 PROCEDURE — 99285 EMERGENCY DEPT VISIT HI MDM: CPT

## 2024-08-05 PROCEDURE — 84484 ASSAY OF TROPONIN QUANT: CPT | Performed by: EMERGENCY MEDICINE

## 2024-08-05 PROCEDURE — 72100 X-RAY EXAM L-S SPINE 2/3 VWS: CPT | Performed by: EMERGENCY MEDICINE

## 2024-08-05 PROCEDURE — 36415 COLL VENOUS BLD VENIPUNCTURE: CPT

## 2024-08-05 PROCEDURE — 93010 ELECTROCARDIOGRAM REPORT: CPT

## 2024-08-05 PROCEDURE — 93005 ELECTROCARDIOGRAM TRACING: CPT

## 2024-08-05 PROCEDURE — 70551 MRI BRAIN STEM W/O DYE: CPT | Performed by: EMERGENCY MEDICINE

## 2024-08-05 PROCEDURE — 80053 COMPREHEN METABOLIC PANEL: CPT | Performed by: EMERGENCY MEDICINE

## 2024-08-05 PROCEDURE — 85025 COMPLETE CBC W/AUTO DIFF WBC: CPT | Performed by: EMERGENCY MEDICINE

## 2024-08-05 PROCEDURE — 72125 CT NECK SPINE W/O DYE: CPT | Performed by: EMERGENCY MEDICINE

## 2024-08-05 PROCEDURE — 70450 CT HEAD/BRAIN W/O DYE: CPT | Performed by: EMERGENCY MEDICINE

## 2024-08-05 RX ORDER — METOPROLOL TARTRATE 100 MG/1
100 TABLET ORAL 2 TIMES DAILY
COMMUNITY

## 2024-08-05 RX ORDER — HYDROCODONE BITARTRATE AND ACETAMINOPHEN 5; 325 MG/1; MG/1
2 TABLET ORAL ONCE
Status: COMPLETED | OUTPATIENT
Start: 2024-08-05 | End: 2024-08-05

## 2024-08-05 RX ORDER — HYDROCHLOROTHIAZIDE 25 MG/1
25 TABLET ORAL DAILY
COMMUNITY

## 2024-08-05 NOTE — ED INITIAL ASSESSMENT (HPI)
Pt fell last night at 11pm. Per wife, pt hit his head against the dresser, + bleeding at the time, but now cotolled. + thinners. Pt has hx of TIAs. Per wife, pt now acting appropriately.

## 2024-08-05 NOTE — ED PROVIDER NOTES
Patient Seen in: [facility] Emergency Department      History     Chief Complaint   Patient presents with    Head Neck Injury    Fall     Stated Complaint: Pts wife states he fell out of bed last night at 11 pm and at his head on the d*    Subjective:   HPI    74-year-old male comes to the hospital with a complaint of having pain by the area of his head, neck and his lower back.  The patient had gotten out of bed and lost his balance falling hitting his head on the dresser on the left side.  He has a history of having balance problems.  He cannot quite remember the event clearly.  He denies any syncope.  He denies any pain in his chest or abdomen.  No nausea or vomiting.  No numbness or weakness.  He has no other significant extremity pain.  He is denying any other complaints at this time.  Review of patient's medication list shows that he is on aspirin as well as Xarelto.    Objective:   Past Medical History:    Acute, but ill-defined, cerebrovascular disease    ASTHMA    BACK PAIN    DEPRESSION    on meds s/p cabg    DIABETES    Diabetes mellitus (HCC)    Frequent urination    Hearing loss    Hemorrhoids    HYPERTENSION    HYPERTRIGLYCERIDEMIA    SLEEP APNEA    Sleep apnea              Past Surgical History:   Procedure Laterality Date    Back surgery      epidural injections in the lumbar area but no surgery    Cabg  1998    double but he continued to smoke    Cabg  2002    quintuple    Colonoscopy  2007    NL    Hernia surgery  1991    inguinal hernia    Hernia surgery      inguinial hernia opposite side    Other surgical history      left elbow                Social History     Socioeconomic History    Marital status:    Tobacco Use    Smoking status: Former    Smokeless tobacco: Never    Tobacco comments:     2002 after erickson bypass   Substance and Sexual Activity    Alcohol use: No    Drug use: No     Social Determinants of Health     Physical Activity: Insufficiently Active (2/20/2023)     [facility]    Exercise Vital Sign     On average, how many days per week do you engage in moderate to strenuous exercise (like a brisk walk)?: 4 days     On average, how many minutes do you engage in exercise at this level?: 30 min              Review of Systems    Positive for stated Chief Complaint: Head Neck Injury and Fall    Other systems are as noted in HPI.  Constitutional and vital signs reviewed.      All other systems reviewed and negative except as noted above.    Physical Exam     ED Triage Vitals [08/05/24 0639]   BP (!) 147/91   Pulse 69   Resp 15   Temp 97.6 °F (36.4 °C)   Temp src Temporal   SpO2 96 %   O2 Device None (Room air)       Current Vitals:   Vital Signs  BP: 140/68  Pulse: 62  Resp: 17  Temp: 97.6 °F (36.4 °C)  Temp src: Temporal  MAP (mmHg): 90    Oxygen Therapy  SpO2: 96 %  O2 Device: None (Room air)            Physical Exam    HEENT : NC, superficial abrasion noted to left parietal region, EOMI, PEERL,  neck with some tenderness noted over the musculature, no JVD, trachea midline, No LAD  Heart: S1S2 normal. No murmurs, regular rate and rhythm  Lungs: Clear to auscultation bilaterally  Abdomen: Soft nontender nondistended normal active bowel sounds without rebound, guarding or masses noted  Back tenderness noted with palpation and wound across lower back  Extremity no clubbing, cyanosis or edema noted.  Full range of motion noted without tenderness  Neuro: No focal deficits noted    All measures to prevent infection transmission during my interaction with the patient were taken.  The patient was already wearing droplet mask on my arrival to the room.  Personal protective equipment including a droplet mask as well as gloves were worn throughout the duration of my exam.  Hand washing was performed prior to and after the exam.  Stethoscope and equipment used during my examination was cleaned with a super Sani cloth germicidal wipe following  the exam.    ED Course     Labs Reviewed   COMP METABOLIC PANEL (14) - Abnormal; Notable for the following components:       Result Value    Glucose 106 (*)     Alkaline Phosphatase 40 (*)     Albumin 4.9 (*)     A/G Ratio 2.1 (*)     All other components within normal limits   TROPONIN I HIGH SENSITIVITY - Normal   CBC WITH DIFFERENTIAL WITH PLATELET    Narrative:     The following orders were created for panel order CBC With Differential With Platelet.  Procedure                               Abnormality         Status                     ---------                               -----------         ------                     CBC W/ DIFFERENTIAL[[id number]]                              Final result                 Please view results for these tests on the individual orders.   CBC W/ DIFFERENTIAL     EKG    Rate, intervals and axes as noted on EKG Report.  Rate: 69  Rhythm: Sinus Rhythm  Reading: , QRS 92, patient is normal sinus rhythm first-degree AV block noted at this time.  T wave inversions laterally noted without old EKG to compare            The patient scalp abrasion was cleaned appropriately.  ED Course as of 08/05/24 1205  ------------------------------------------------------------  Time: 08/05 1203  Comment: While the patient CBC was normal.  Troponin is 11.  Electrolytes were otherwise normal.  The patient had a chest x-ray that personally interpreted showed no acute cardiopulmonary process.  Read the radiology report as well.  His LS-spine was negative for injury.  CT brain as well as a CT C-spine as well as an MRI brain showing no other acute abnormality at this particular time.     MRI BRAIN (CPT=70551)    Result Date: 8/5/2024  PROCEDURE:  MRI BRAIN (CPT=70551)  COMPARISON:  None.  INDICATIONS:  Pts wife states he fell out of bed last night at 11 pm and at his head on the dresser. Unsure if he takes blood thinners. Denies LOC.  Headache, abnormal CT  TECHNIQUE:  MRI of the brain was performed  with multi-planar T1, T2-weighted images with FLAIR sequences and diffusion weighted images without infusion.  PATIENT STATED HISTORY: (As transcribed by Technologist)  Left side of head vs dresser 08/04/2024 2300hr.   FINDINGS: Patient motion noted.  Mild global brain parenchymal volume loss without overt hydrocephalus.  There is no midline shift or mass effect.  The basal cisterns are patent.  The gray-white matter differentiation is intact.  The craniocervical junction is unremarkable.   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  Trace chronic microvascular ischemic changes elsewhere in the cerebral white matter.  There is no acute intracranial hemorrhage or extra-axial fluid collection identified.  No significant abnormal parenchymal gradient susceptibility. There is no restricted diffusion to suggest acute ischemia/infarction.  Trace fluid in the right mastoid air cells.  Mild right maxillary and trace ethmoid mucosal thickening.  Bilateral intra-ocular lens implants The expected major intracranial flow voids are present.             CONCLUSION:   1. No acute intracranial abnormality identified.  2. 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  Trace chronic microvascular ischemic changes elsewhere in the cerebral white matter.  LOCATION:  [id number]   Dictated by (CST): Isidro Castro MD on 8/05/2024 at 11:46 AM     Finalized by (CST): Isidro Castro MD on 8/05/2024 at 11:48 AM       CT SPINE CERVICAL (CPT=72125)    Result Date: 8/5/2024  PROCEDURE:  CT SPINE CERVICAL (CPT=72125)  COMPARISON:  None.  INDICATIONS:  Pts wife states he fell out of bed last night at 11 pm and at his head on the dresser. Unsure if he takes blood thinners. Denies LOC. Currently complaining of h  TECHNIQUE:  Noncontrast CT scanning of the cervical spine is performed from the skull base through C7.  Multiplanar reconstructions are generated.  Dose  reduction techniques were used. Dose information is transmitted to the ACR (American College of Radiology) NRDR (National Radiology Data Registry) which includes the Dose Index Registry.  PATIENT STATED HISTORY: (As transcribed by Technologist)  Fell from bed while sleeping. Left side head laceration.Neck pain.    FINDINGS:  CRANIOCERVICAL AREA:  Normal foramen magnum with no Chiari malformation. PARASPINAL AREA:  Moderate carotid artery calcification. BONES:  Straightening of the cervical lordosis.  The C1-2 articulation is intact.  No fracture or subluxation.  CERVICAL DISC LEVELS: The patient is status post anterior cervical discectomy and fusion C5 through C7.  There is fusion across the disc spaces.  The hardware is intact. There is multiple level facet arthropathy most severe on the right at C2-3 and C3-4. There are bulky anterior endplate osteophytes C3-4 and C4-5.  There is a small central disc protrusion at C3-4 without significant deformity of the spinal cord.  No canal stenosis.  There is foraminal narrowing moderate degree on the right at C3-4.  There are endplate osteophytes posteriorly at C3-4 also noted.  There are changes of uncinate arthritis C3-4 and C4-5.            CONCLUSION:  No acute fracture.  Status post C5 through C7 fusion.  Cervical spondylosis most severe at C3-4 C4-5.  No canal stenosis.  Details above.    LOCATION:  [place] [id number]   Dictated by (CST): Josse Alaniz MD on 8/05/2024 at 8:27 AM     Finalized by (CST): Josse Alaniz MD on 8/05/2024 at 8:31 AM       CT BRAIN OR HEAD (CPT=70450)    Result Date: 8/5/2024  PROCEDURE:  CT BRAIN OR HEAD (46928)  COMPARISON:  None.  INDICATIONS:  Pts wife states he fell out of bed last night at 11 pm and at his head on the dresser. Unsure if he takes blood thinners. Denies LOC. Currently complaining of head pain  TECHNIQUE:  Noncontrast CT scanning is performed through the brain. Dose reduction techniques were used. Dose information is  transmitted to the ACR (American College of Radiology) NRDR (National Radiology Data Registry) which includes the Dose Index Registry.  PATIENT STATED HISTORY: (As transcribed by Technologist)  Fell from bed while sleeping. Left side head laceration.Neck pain.    FINDINGS:  VENTRICLES/SULCI:  There is minimal atrophy.  No overt hydrocephalus.  The basal cisterns are patent. INTRACRANIAL:  Within the watershed area of the right cerebrum/anterior lateral right parietal lobe, there is a 4.1 x 3.2 cm focus of decreased CT density which does extend to the cortex.  No hemorrhage or significant mass effect.  This most likely represents a subacute/chronic infarct.  MRI is recommended for further evaluation however.  No midline shift. SINUSES:           No sign of acute sinusitis.  Mild to moderate mucosal thickening right maxillary sinus. MASTOIDS:          No sign of acute inflammation. SKULL:             No evidence for fracture or osseous abnormality. OTHER:             None.            CONCLUSION:  1. Within the watershed area of the right cerebrum/anterior lateral right parietal lobe, there is a 4.1 x 3.2 cm focus of decreased CT density which does extend to the cortex.  No hemorrhage or significant mass effect.  This most likely represents a subacute/chronic infarct.  MRI is recommended for further evaluation, however.  No hemorrhage.     LOCATION:  [place] [id number]   Dictated by (CST): Josse Alaniz MD on 8/05/2024 at 8:20 AM     Finalized by (CST): Josse Alaniz MD on 8/05/2024 at 8:26 AM       XR LUMBAR SPINE (MIN 2 VIEWS) (CPT=72100)    Result Date: 8/5/2024  PROCEDURE:  XR LUMBAR SPINE (MIN 2 VIEWS) (CPT=72100)  TECHNIQUE:  AP, lateral, and coned down L5-S1 views were obtained.  COMPARISON:  None.  INDICATIONS:  Pts wife states he fell out of bed last night at 11 pm and at his head on the dresser. Unsure if he takes blood thinners. Denies LOC. Currently complaining of h  PATIENT STATED HISTORY: (As transcribed  by Technologist)  Patient stated he fell out of bed this morning and has neck and back pain.    FINDINGS:    BONES:  There is normal alignment.  There are anterior osteophytes at all levels.  There is no fracture. DISC SPACES:  Disc spaces are preserved. PARASPINOUS:  There is diffuse aortic atherosclerotic calcification noted. OTHER:  Negative.             CONCLUSION:  There is degenerative disc disease in the lumbar spine.  There is no acute abnormality.    LOCATION:  [facility]   Dictated by (CST): Romeo Day MD on 8/05/2024 at 7:45 AM     Finalized by (CST): Romeo Day MD on 8/05/2024 at 8:00 AM       XR CHEST AP PORTABLE  (CPT=71045)    Result Date: 8/5/2024  PROCEDURE:  XR CHEST AP PORTABLE  (CPT=71045)  TECHNIQUE:  AP chest radiograph was obtained.  COMPARISON:  [facility] , XR, CHEST PA   LATERAL, 4/14/2008, 10:15 AM.  [facility] , XR, XR CHEST PA + LAT CHEST (CPT=71046), 6/14/2018, 8:38 AM.  INDICATIONS:  Pts wife states he fell out of bed last night at 11 pm and at his head on the dresser. Unsure if he takes blood thinners. Denies LOC. Currently complaining of headache  PATIENT STATED HISTORY: (As transcribed by Technologist)  Patient offered no additional history at this time.    FINDINGS:  There are sternotomy wires and surgical clips consistent with previous CABG.  Compared to previous chest radiographs there are 3 new wire densities identified with 2 of the wires projecting over the mediastinum just caudal to the most cephalad  sternotomy wire and the other wire just caudal to the 3rd sternotomy wire.  These are of uncertain clinical significance.  Heart size is within the limits of normal.  There is a mildly tortuous thoracic aorta with aortic calcification which is unchanged.  There is no focal infiltrate, consolidation or pulmonary edema.  Orthopedic hardware seen projecting over the lower cervical spine.  The left 1st rib appears absent.            CONCLUSION:  1. Status post CABG. 2. No pulmonary edema,  focal infiltrate or pleural effusion. 3. There are new wire densities identified projecting over the mediastinum as detailed above.  These are adjacent to usual appearing sternotomy wires which are unchanged from previous exams.  Clinical correlation is necessary.  Correlate for any interim thoracic surgical procedures compared to the most recent chest radiograph from 6/14/2018.   LOCATION:  [facility]      Dictated by (CST): Abraham Lima MD on 8/05/2024 at 7:07 AM     Finalized by (CST): Abraham Lima MD on 8/05/2024 at 7:18 AM        Medications   HYDROcodone-acetaminophen (Norco) 5-325 MG per tab 2 tablet (2 tablets Oral Given 8/5/24 0848)              MDM      Differential diagnosis included intracranial hemorrhage, CVA, bony fractures but not limited such.  Patient has a head injury noted without acute traumatic findings on imaging.  Patient's bony x-rays showed no fractures and at this time the patient was discharged home for the outpatient management care.  Is given Norco for discomfort while here.    Patient was screened and evaluated during this visit.   As a treating physician attending to the patient, I determined, within reasonable clinical confidence and prior to discharge, that an emergency medical condition was not or was no longer present.  There was no indication for further evaluation, treatment or admission on an emergency basis.       The usual and customary discharge instuctions were discussed given the patient's ER course.  We discussed signs and symptoms that should prompt the patient's immediate return to the emergency department.   Reasonable over the counter and prescription treatment options and Physician follow up plan was discussed.       The patient is discharged in good condition.       This note was prepared using Dragon Medical voice recognition dictation software.  As a result errors may occur.  When identified to these areas have been corrected.  While every attempt is made  to correct errors during dictation discrepancies may still exist.  Please contact if there are any errors.                                   Medical Decision Making      Disposition and Plan     Clinical Impression:  1. Injury of head, initial encounter    2. Anticoagulated    3. Strain of neck muscle, initial encounter    4. Back strain, initial encounter         Disposition:  Discharge  8/5/2024 12:05 pm    Follow-up:  Gordon Zuniga MD  [address] [place] [id number]  [phone]    Schedule an appointment as soon as possible for a visit in 2 day(s)            Medications Prescribed:  Current Discharge Medication List

## 2024-08-05 NOTE — ED QUICK NOTES
Pt ambulates to bathroom with a steady gait and no complaints. Pt is currently eating after consulting with ER physician.

## 2024-08-05 NOTE — ED QUICK NOTES
Patient resting on cart. Still c/o left side of face/neck pain, but states that it is tolerable. Patient states takes Norco daily, but has not had one yet today. MD notified.

## 2024-09-04 RX ORDER — HYDROCODONE BITARTRATE AND ACETAMINOPHEN 5; 325 MG/1; MG/1
TABLET ORAL
COMMUNITY
Start: 2024-07-09

## 2024-09-04 RX ORDER — ICOSAPENT ETHYL 1 G/1
2 CAPSULE ORAL
COMMUNITY
Start: 2024-04-12

## 2024-09-05 ENCOUNTER — HOSPITAL ENCOUNTER (OUTPATIENT)
Dept: GENERAL RADIOLOGY | Age: 74
Discharge: HOME | End: 2024-09-05
Attending: STUDENT IN AN ORGANIZED HEALTH CARE EDUCATION/TRAINING PROGRAM
Payer: MEDICARE

## 2024-09-05 ENCOUNTER — OFFICE VISIT (OUTPATIENT)
Dept: FAMILY MEDICINE CLINIC | Facility: CLINIC | Age: 74
End: 2024-09-05
Payer: MEDICARE

## 2024-09-05 VITALS
BODY MASS INDEX: 33.62 KG/M2 | HEIGHT: 69 IN | OXYGEN SATURATION: 94 % | WEIGHT: 227 LBS | DIASTOLIC BLOOD PRESSURE: 70 MMHG | SYSTOLIC BLOOD PRESSURE: 150 MMHG | TEMPERATURE: 97 F | HEART RATE: 67 BPM

## 2024-09-05 DIAGNOSIS — Z95.1 S/P CABG (CORONARY ARTERY BYPASS GRAFT): ICD-10-CM

## 2024-09-05 DIAGNOSIS — M25.512 ACUTE PAIN OF LEFT SHOULDER: ICD-10-CM

## 2024-09-05 DIAGNOSIS — R80.9 TYPE 2 DIABETES MELLITUS WITH MICROALBUMINURIA (HCC): ICD-10-CM

## 2024-09-05 DIAGNOSIS — I73.9 PAD (PERIPHERAL ARTERY DISEASE) (HCC): ICD-10-CM

## 2024-09-05 DIAGNOSIS — I65.23 BILATERAL CAROTID ARTERY STENOSIS: ICD-10-CM

## 2024-09-05 DIAGNOSIS — M25.512 ACUTE PAIN OF LEFT SHOULDER: Primary | ICD-10-CM

## 2024-09-05 DIAGNOSIS — I10 PRIMARY HYPERTENSION: ICD-10-CM

## 2024-09-05 DIAGNOSIS — E11.29 TYPE 2 DIABETES MELLITUS WITH MICROALBUMINURIA (HCC): ICD-10-CM

## 2024-09-05 DIAGNOSIS — I25.10 CORONARY ARTERY DISEASE INVOLVING NATIVE CORONARY ARTERY OF NATIVE HEART WITHOUT ANGINA PECTORIS: ICD-10-CM

## 2024-09-05 PROCEDURE — 99205 OFFICE O/P NEW HI 60 MIN: CPT | Performed by: STUDENT IN AN ORGANIZED HEALTH CARE EDUCATION/TRAINING PROGRAM

## 2024-09-05 PROCEDURE — 73030 X-RAY EXAM OF SHOULDER: CPT | Performed by: STUDENT IN AN ORGANIZED HEALTH CARE EDUCATION/TRAINING PROGRAM

## 2024-09-05 NOTE — PROGRESS NOTES
Subjective:   Camden Dhillon is a 74 year old male who presents for \[facility] [place] [facility]\"" Care (Left shoulder pain due to a fall 2 weeks ago. )     74-year-old male complaining of left shoulder pain and coming in to establish care.  Was following up with primary care physician at [facility] Dr. Coronel.  History of diabetes and heart disease.  His cardiologist is also at [facility] Dr. Miguel Julian.  Patient states had 3 open heart surgeries in 1998, 2002 and 2019.  Mentions having a recent CTA neck that showed 60% stenosis of the right ICA and 30% stenosis of left ICA.  [facility]    2 weeks ago around 8/5/2024 patient rolled out of bed hit his left temple against the dresser and fell onto the floor.  Denied chest pain, nausea, vomiting, LOC.  Patient is on aspirin and Xarelto.  Had a negative workup in the ED and was discharged home with [facility] as needed.  Now complaining of left shoulder pain with limited ROM since fall.    History/Other:    Chief Complaint Reviewed and Verified  Nursing Notes Reviewed and   Verified  Tobacco Reviewed  Allergies Reviewed  Medications Reviewed    Problem List Reviewed  Medical History Reviewed  Surgical History   Reviewed  Family History Reviewed  Social History Reviewed         Tobacco:  He smoked tobacco in the past but quit greater than 12 months ago.  Social History     Tobacco Use   Smoking Status Former    Passive exposure: Past   Smokeless Tobacco Never   Tobacco Comments    2002 after erickson bypass        Current Outpatient Medications   Medication Sig Dispense Refill    HYDROcodone-acetaminophen 5-325 MG Oral Tab Take by mouth.      Icosapent Ethyl 1 g Oral Cap Take 2 g by mouth.      hydroCHLOROthiazide 25 MG Oral Tab Take 1 tablet (25 mg total) by mouth daily.      metoprolol tartrate 100 MG Oral Tab Take 1 tablet (100 mg total) by mouth 2 (two) times daily.      valsartan 320 MG Oral Tab Take 1 tablet (320 mg total) by mouth  daily.      Empagliflozin (JARDIANCE) 25 MG Oral Tab Take by mouth.      albuterol 108 (90 Base) MCG/ACT Inhalation Aero Soln Inhale into the lungs.      rivaroxaban 2.5 MG Oral Tab Take 1 tablet (2.5 mg total) by mouth 2 (two) times daily.      insulin aspart 100 UNIT/ML Subcutaneous Solution Cartridge Inject 5 Units into the skin 3 (three) times daily before meals.      atorvastatin 80 MG Oral Tab Take 1 tablet (80 mg total) by mouth nightly.      AmLODIPine Besylate 5 MG Oral Tab Take 1 tablet (5 mg total) by mouth daily.      escitalopram 20 MG Oral Tab Take 2 tablets (40 mg total) by mouth daily.      Fenofibrate 160 MG Oral Tab Take 0.9063 tablets (145 mg total) by mouth daily.      insulin glargine (LANTUS) 100 UNIT/ML Subcutaneous Solution Inject 65 Units into the skin 2 (two) times daily.      lisinopril 40 MG Oral Tab Take 1 tablet (40 mg total) by mouth daily.      MetFORMIN HCl 1000 MG Oral Tab Take 1 tablet (1,000 mg total) by mouth 2 (two) times daily with meals. 1 tab in AM & 1 1/2 tab in PM      aspirin 325 MG Oral Tab Take 1 tablet (325 mg total) by mouth daily.      Multiple Vitamins-Minerals (MULTIVITAL) Oral Tab Take 1 tablet by mouth daily.      Cholecalciferol (VITAMIN D3) 3000 units Oral Tab Take by mouth.           Review of Systems:  Review of Systems   Constitutional:  Negative for chills, diaphoresis and fever.   HENT:  Negative for congestion, ear discharge, ear pain, sinus pressure, sinus pain and sore throat.    Eyes:  Negative for pain and discharge.   Respiratory:  Negative for cough, chest tightness, shortness of breath and wheezing.    Cardiovascular:  Negative for chest pain and palpitations.   Gastrointestinal:  Negative for abdominal pain, diarrhea, nausea and vomiting.   Endocrine: Negative for cold intolerance and heat intolerance.   Genitourinary:  Negative for dysuria, flank pain, frequency and urgency.   Musculoskeletal:  Negative for joint swelling.        Left shoulder  pain.   Skin:  Negative for rash.   Neurological:  Negative for dizziness, syncope and headaches.   Psychiatric/Behavioral:  Negative for confusion and hallucinations.        Objective:   /70   Pulse 67   Temp 96.8 °F (36 °C) (Temporal)   Ht 5' 9\" (1.753 m)   Wt 227 lb (103 kg)   SpO2 94%   BMI 33.52 kg/m²  Estimated body mass index is 33.52 kg/m² as calculated from the following:    Height as of this encounter: 5' 9\" (1.753 m).    Weight as of this encounter: 227 lb (103 kg).  Physical Exam  Constitutional:       General: He is not in acute distress.     Appearance: Normal appearance. He is obese. He is not ill-appearing or toxic-appearing.   HENT:      Head: Normocephalic and atraumatic.   Cardiovascular:      Rate and Rhythm: Normal rate and regular rhythm.      Heart sounds: Normal heart sounds. No murmur heard.     No gallop.   Pulmonary:      Effort: Pulmonary effort is normal. No respiratory distress.      Breath sounds: Normal breath sounds. No stridor. No wheezing, rhonchi or rales.   Abdominal:      General: Bowel sounds are normal.      Palpations: Abdomen is soft.      Tenderness: There is no abdominal tenderness. There is no right CVA tenderness, left CVA tenderness or guarding.   Musculoskeletal:      Cervical back: Normal range of motion and neck supple. No rigidity or tenderness.      Comments: Left shoulder: Tenderness to palpation over AC joint and anterior shoulder line.  Limited active shoulder flexion to 10 degrees, limited passive shoulder flexion to 90 degrees, limited active shoulder abduction to 30 degrees, limited passive shoulder abduction to 90 degrees.   Skin:     General: Skin is warm and dry.   Neurological:      General: No focal deficit present.      Mental Status: He is alert and oriented to person, place, and time. Mental status is at baseline.   Psychiatric:         Mood and Affect: Mood normal.         Behavior: Behavior normal.         Thought Content: Thought  content normal.         Judgment: Judgment normal.         Assessment & Plan:     MRI BRAIN (CPT=70551)    Result Date: 8/5/2024  CONCLUSION:   1. No acute intracranial abnormality identified.  2. 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  Trace chronic microvascular ischemic changes elsewhere in the cerebral white matter.  LOCATION:  [id number]   Dictated by (CST): Isidro Castro MD on 8/05/2024 at 11:46 AM     Finalized by (CST): Isidro Castro MD on 8/05/2024 at 11:48 AM       CT SPINE CERVICAL (CPT=72125)    Result Date: 8/5/2024  CONCLUSION:  No acute fracture.  Status post C5 through C7 fusion.  Cervical spondylosis most severe at C3-4 C4-5.  No canal stenosis.  Details above.    LOCATION:  [id number]   Dictated by (CST): Josse Alaniz MD on 8/05/2024 at 8:27 AM     Finalized by (CST): Josse Alaniz MD on 8/05/2024 at 8:31 AM       CT BRAIN OR HEAD (CPT=70450)    Result Date: 8/5/2024  CONCLUSION:  1. Within the watershed area of the right cerebrum/anterior lateral right parietal lobe, there is a 4.1 x 3.2 cm focus of decreased CT density which does extend to the cortex.  No hemorrhage or significant mass effect.  This most likely represents a subacute/chronic infarct.  MRI is recommended for further evaluation, however.  No hemorrhage.     LOCATION:  [id number]   Dictated by (CST): Josse Alaniz MD on 8/05/2024 at 8:20 AM     Finalized by (CST): Josse Alaniz MD on 8/05/2024 at 8:26 AM       XR LUMBAR SPINE (MIN 2 VIEWS) (CPT=72100)    Result Date: 8/5/2024  CONCLUSION:  There is degenerative disc disease in the lumbar spine.  There is no acute abnormality.    LOCATION:  [facility]   Dictated by (CST): Romeo Day MD on 8/05/2024 at 7:45 AM     Finalized by (CST): Romeo Day MD on 8/05/2024 at 8:00 AM       XR CHEST AP PORTABLE  (CPT=71045)    Result Date: 8/5/2024  CONCLUSION:  1. Status post CABG. 2. No pulmonary edema, focal infiltrate or pleural  effusion. 3. There are new wire densities identified projecting over the mediastinum as detailed above.  These are adjacent to usual appearing sternotomy wires which are unchanged from previous exams.  Clinical correlation is necessary.  Correlate for any interim thoracic surgical procedures compared to the most recent chest radiograph from 6/14/2018.   LOCATION:  [facility]      Dictated by (CST): Abraham Lima MD on 8/05/2024 at 7:07 AM     Finalized by (CST): Abraham Lima MD on 8/05/2024 at 7:18 AM          1. Acute pain of left shoulder (Primary)  Status post fall rolling out of bed.  -If x-ray negative can proceed with physical therapy.   -MRI pending above.  -     XR SHOULDER, COMPLETE (MIN 2 VIEWS), LEFT (CPT=73030); Future; Expected date: 09/05/2024  -     Physical Therapy Referral - [facility] Locations  2. Bilateral carotid artery stenosis  1/17/2024 CTA demonstrating relatively stable 60% right ICA stenosis and 30% left   ICA stenosis.   -Continue follow-up with cardiology Dr. Julian and Dr. Stoner  3. S/P CABG (coronary artery bypass graft)  -Continue follow-up with cardiology Dr. Julian   4. Coronary artery disease involving native coronary artery of native heart without angina pectoris  -Continue follow-up with cardiology Dr. Julian   5. PAD (peripheral artery disease) ([facility])  Doing a walking program.  On aspirin, Xarelto and statin.  -Continue follow-up with cardiology Dr. Julian and Dr. Stoner  6. Primary hypertension  BP elevated during office visit today.    -Advised to follow-up with cardiology Dr. Julian  7. Type 2 diabetes mellitus with microalbuminuria ([facility])  Last A1c value was 5.6% done 5/7/2024.  On lisinopril.  Due for his diabetic eye exam.  States was contacted from ophthalmology office.  Will forward office visit notes once completed.  -[facility]  Due for urine microalbumin/creatinine ratio        Return in about 3 months (around 12/5/2024) for Routine physical exam  visit.    Gordon Zuniga MD, 9/5/2024, 9:05 AM           Time spent: 60 minutes, obtaining history, chart review, evaluating patient, discussing differential diagnosis, recommendations, diagnostic testing options, treatment options, risks and benefits of my recommendations, counseling patient, discussing prognosis/expectations, and follow up with patient as well as completing documentation.

## 2024-10-12 ENCOUNTER — PATIENT MESSAGE (OUTPATIENT)
Dept: FAMILY MEDICINE CLINIC | Facility: CLINIC | Age: 74
End: 2024-10-12

## 2024-10-12 DIAGNOSIS — M25.512 ACUTE PAIN OF LEFT SHOULDER: Primary | ICD-10-CM

## 2024-11-14 ENCOUNTER — CLINICAL ABSTRACT (OUTPATIENT)
Dept: OTHER | Age: 74
End: 2024-11-14

## 2025-01-31 ENCOUNTER — PATIENT MESSAGE (OUTPATIENT)
Dept: FAMILY MEDICINE CLINIC | Facility: CLINIC | Age: 75
End: 2025-01-31

## 2025-01-31 DIAGNOSIS — Z95.1 S/P CABG (CORONARY ARTERY BYPASS GRAFT): ICD-10-CM

## 2025-01-31 DIAGNOSIS — I25.10 CORONARY ARTERY DISEASE INVOLVING NATIVE CORONARY ARTERY OF NATIVE HEART WITHOUT ANGINA PECTORIS: Primary | ICD-10-CM

## 2025-02-26 ENCOUNTER — HOSPITAL ENCOUNTER (EMERGENCY)
Facility: HOSPITAL | Age: 75
Discharge: HOME | End: 2025-02-26
Attending: EMERGENCY MEDICINE
Payer: MEDICARE

## 2025-02-26 ENCOUNTER — APPOINTMENT (OUTPATIENT)
Dept: GENERAL RADIOLOGY | Facility: HOSPITAL | Age: 75
End: 2025-02-26
Payer: MEDICARE

## 2025-02-26 VITALS
WEIGHT: 222 LBS | RESPIRATION RATE: 16 BRPM | OXYGEN SATURATION: 96 % | SYSTOLIC BLOOD PRESSURE: 150 MMHG | HEIGHT: 69 IN | BODY MASS INDEX: 32.88 KG/M2 | TEMPERATURE: 97 F | HEART RATE: 68 BPM | DIASTOLIC BLOOD PRESSURE: 70 MMHG

## 2025-02-26 DIAGNOSIS — S61.211A LACERATION OF LEFT INDEX FINGER WITHOUT FOREIGN BODY WITHOUT DAMAGE TO NAIL, INITIAL ENCOUNTER: Primary | ICD-10-CM

## 2025-02-26 PROCEDURE — 12001 RPR S/N/AX/GEN/TRNK 2.5CM/<: CPT

## 2025-02-26 PROCEDURE — 99283 EMERGENCY DEPT VISIT LOW MDM: CPT

## 2025-02-26 RX ORDER — LIDOCAINE HYDROCHLORIDE AND EPINEPHRINE 10; 10 MG/ML; UG/ML
20 INJECTION, SOLUTION INFILTRATION; PERINEURAL ONCE
Status: COMPLETED | OUTPATIENT
Start: 2025-02-26 | End: 2025-02-26

## 2025-02-26 RX ORDER — LIDOCAINE HYDROCHLORIDE AND EPINEPHRINE 10; 10 MG/ML; UG/ML
INJECTION, SOLUTION INFILTRATION; PERINEURAL
Status: COMPLETED
Start: 2025-02-26 | End: 2025-02-26

## 2025-02-26 RX ORDER — CEPHALEXIN 500 MG/1
500 CAPSULE ORAL 3 TIMES DAILY
Qty: 21 CAPSULE | Refills: 0 | Status: SHIPPED | OUTPATIENT
Start: 2025-02-26 | End: 2025-03-05

## 2025-02-26 NOTE — ED INITIAL ASSESSMENT (HPI)
PT states that he sustained a cut to his left hand's second digit two days ago, has been treating at home but today it has been bleeding more than it did before. PT describes the cut as \"deep with a flap\", self applied dressing present.

## 2025-02-26 NOTE — DISCHARGE INSTRUCTIONS
Leave the dressing on for 2 days and then after that you can take the dressing off and gently rinse with soap and water apply Neosporin ointment bandage keep it covered and you should follow-up in about 10 days for suture removal.  If there is any signs of redness or fever get rechecked for concern for infection take the antibiotics as prescribed

## 2025-02-26 NOTE — ED PROVIDER NOTES
Discussed procedure with patient and had opportunity to answer questions regarding procedure.   Consent was given.    PROCEDURE NOTE  LACERATION, SIMPLE  Location: Left 2nd finger.  Length of wound : 1 cm    Local Anesthesia: performed by Dr Gastelum    Wound was copiously irrigated using high pressure normal saline resulting in a clean appearing wound.    Skin adjacent to the wound was prepped and draped.   Wound was explored.    No foreign body was visualized. No sign of injury to deeper anatomical structures (tendon, muscles, nerve) was seen.    Wound was closed with  2 simple interrupted sutures using 4-O nylon. No complications.

## 2025-02-26 NOTE — ED PROVIDER NOTES
Patient Seen in: [facility] Emergency Department      History     Chief Complaint   Patient presents with    Laceration     Stated Complaint: Lac to left index finger on Monday.    Subjective:   HPI      74-year-old male complaining of laceration left index finger.  This happened 2 days ago however it continues to bleed and he states he got some new knives and they are very sharp.  He is on Xarelto for history of cardiac problems.  Denies any other injury.  Tetanus imitation up-to-date    Objective:     Past Medical History:    Acute, but ill-defined, cerebrovascular disease    ASTHMA    BACK PAIN    Concussion    DEPRESSION    on meds s/p cabg    DIABETES    Diabetes mellitus (HCC)    Frequent urination    Hearing loss    Hemorrhoids    HYPERTENSION    HYPERTRIGLYCERIDEMIA    SLEEP APNEA    Sleep apnea              Past Surgical History:   Procedure Laterality Date    Back surgery      epidural injections in the lumbar area but no surgery    Cabg  1998    double but he continued to smoke    Cabg  2002    quintuple    Colonoscopy  2007    NL    Hernia surgery  1991    inguinal hernia    Hernia surgery      inguinial hernia opposite side    Other surgical history      left elbow                Social History     Socioeconomic History    Marital status:    Tobacco Use    Smoking status: Former     Passive exposure: Past    Smokeless tobacco: Never    Tobacco comments:     2002 after erickson bypass   Vaping Use    Vaping status: Never Used   Substance and Sexual Activity    Alcohol use: No    Drug use: No   Other Topics Concern    Exercise No    Special Diet No    Stress Concern No    Weight Concern No                  Physical Exam     ED Triage Vitals [02/26/25 1019]   /70   Pulse 68   Resp 16   Temp 96.8 °F (36 °C)   Temp src Temporal   SpO2 96 %   O2 Device None (Room air)       Current Vitals:   Vital Signs  BP: 150/70  Pulse: 68  Resp: 16  Temp: 96.8 °F (36 °C)  Temp src: Temporal    Oxygen  Therapy  SpO2: 96 %  O2 Device: None (Room air)        Physical Exam  Patient is alert and oriented 3 no acute distress left index finger there is approximate 1 cm flap-like laceration along the distal phalanx and the radial aspect.  This is slightly in the subcutaneous tissue there is active bleeding with removal of the dressing.  Cap refill and pulses and sensation are intact distally.    ED Course   Labs Reviewed - No data to display         Patient's wound was anesthetized with 1% lidocaine with epinephrine.  Scrubbed and irrigated this was repaired with 5-0 Ethilon 2 sutures placed by the physician assistant student.       MDM      Initial differential diagnosis considered includes wound infection simple laceration nailbed injury foreign body        Medical Decision Making  Patient has persistently bleeding wound despite 2 days of wearing a bandage.  He is on Xarelto.  I discussed the considerations of whether to apply Steri-Strips and more of a pressure bandage as opposed to the risk of suturing wound has been open for a couple of days I felt due to his excessive bleeding that it would be worth the risk of possible infection to suture this loosely and given antibiotics he was in agreement with this.    Disposition and Plan     Clinical Impression:  1. Laceration of left index finger without foreign body without damage to nail, initial encounter         Disposition:  Discharge  2/26/2025 11:34 am    Follow-up:  [facility] Emergency Department  [address] [facility] [address]  [phone]  Follow up in 10 day(s)  For suture removal          Medications Prescribed:  Discharge Medication List as of 2/26/2025 11:44 AM        START taking these medications    Details   cephALEXin 500 MG Oral Cap Take 1 capsule (500 mg total) by mouth 3 (three) times daily for 7 days., Normal, Disp-21 capsule, R-0                 Supplementary Documentation:

## 2025-03-08 ENCOUNTER — HOSPITAL ENCOUNTER (EMERGENCY)
Facility: HOSPITAL | Age: 75
Discharge: HOME | End: 2025-03-08
Attending: EMERGENCY MEDICINE
Payer: MEDICARE

## 2025-03-08 VITALS
SYSTOLIC BLOOD PRESSURE: 140 MMHG | OXYGEN SATURATION: 95 % | TEMPERATURE: 97 F | DIASTOLIC BLOOD PRESSURE: 75 MMHG | BODY MASS INDEX: 33 KG/M2 | HEART RATE: 66 BPM | WEIGHT: 221 LBS | RESPIRATION RATE: 18 BRPM

## 2025-03-08 DIAGNOSIS — Z48.02 ENCOUNTER FOR REMOVAL OF SUTURES: Primary | ICD-10-CM

## 2025-03-08 NOTE — ED PROVIDER NOTES
Patient Seen in: [facility] Emergency Department      History     Chief Complaint   Patient presents with    Sut Stap RingRemoval     Stated Complaint: suture removal    Subjective:   HPI      74-year-old male presents for suture removal.  Patient states he had sutures replaced on 2/26/2025 after finger laceration with a knife.  States he finished antibiotics that were prescribed.  He denies any pain redness or drainage from the wound.    Objective:     No pertinent past medical history.            No pertinent past surgical history.              No pertinent social history.                Physical Exam     ED Triage Vitals   BP    Pulse    Resp    Temp    Temp src    SpO2    O2 Device        Current Vitals:   No data recorded    Physical Exam  Vitals and nursing note reviewed.   Constitutional:       General: He is not in acute distress.     Appearance: He is well-developed. He is not ill-appearing.   HENT:      Head: Normocephalic and atraumatic.   Musculoskeletal:      Comments: Left index finger with approximately 0.5 cm laceration with 1 suture in place.  Wound is open approximately 1 mm with granulation tissue.  Nontender.  No surrounding erythema or drainage.   Skin:     General: Skin is warm and dry.   Neurological:      Mental Status: He is alert and oriented to person, place, and time.      GCS: GCS eye subscore is 4. GCS verbal subscore is 5. GCS motor subscore is 6.   Psychiatric:         Mood and Affect: Mood normal.         Behavior: Behavior normal.             ED Course   Labs Reviewed - No data to display                MDM      74-year-old male presents for suture removal.      Differential includes healing wound versus mild dehiscence    Chart review shows patient had sutures replaced on 2/26/2025 after laceration occurred 2 days previously.  Wound was loosely approximated with twos sutures patient was previously on Keflex for infection prophylaxis.    Laceration appears to be healing well.   It is slightly open and has wound was only loosely approximated given delayed presentation with laceration occurring 2 days previously.  Instructed to keep clean and place antibiotic ointment and bandage until fully closed.  Return precautions discussed.      Medical Decision Making  Amount and/or Complexity of Data Reviewed  External Data Reviewed: notes.     Details: MDM        Disposition and Plan     Clinical Impression:  1. Encounter for removal of sutures         Disposition:  Discharge  3/8/2025 10:46 am    Follow-up:  Gordon Zuniga MD  [address] [place] [id number] [phone]    Follow up  As needed          Medications Prescribed:  Current Discharge Medication List              Supplementary Documentation:

## 2025-07-15 ENCOUNTER — TELEPHONE (OUTPATIENT)
Dept: FAMILY MEDICINE CLINIC | Facility: CLINIC | Age: 75
End: 2025-07-15

## (undated) DIAGNOSIS — D22.9 ATYPICAL MOLE: Primary | ICD-10-CM

## (undated) NOTE — LETTER
10/19/20        [address] [address]      Dear Shanelle Mcdonald,    [facility] records indicate that you have outstanding lab work and or testing that was ordered for you and has not yet been completed:  Orders Placed This Encounter